# Patient Record
Sex: MALE | Race: WHITE | NOT HISPANIC OR LATINO | Employment: OTHER | URBAN - METROPOLITAN AREA
[De-identification: names, ages, dates, MRNs, and addresses within clinical notes are randomized per-mention and may not be internally consistent; named-entity substitution may affect disease eponyms.]

---

## 2021-01-21 ENCOUNTER — DOCUMENTATION ONLY (OUTPATIENT)
Facility: CLINIC | Age: 86
End: 2021-01-21

## 2021-01-22 NOTE — PROGRESS NOTES
PROVIDER NOTIFICATION OF MISSED VISIT - No Action Required  TODAY'S DATE:    PROVIDER:  Dr. Sauceda  NOTIFICATION METHOD (Fax #, Phone #l, EPIC, etc):  EPIC  PATIENT NAME:  Jacob Boland  PATIENT ID:  [id number]  :  33    Medicare Home Health regulation requires [facility] to notify the physician when the plan for visits has been altered.  We have provided fewer visits than ordered.            Missed service:  HA VISIT            Date(s) of missed service:  21 - 2 HR duration            Reason:  Per aide - not available.  The patient has been notified.  Please call our office at [phone] if this is not satisfactory to you.

## 2021-02-12 PROCEDURE — U0003 INFECTIOUS AGENT DETECTION BY NUCLEIC ACID (DNA OR RNA); SEVERE ACUTE RESPIRATORY SYNDROME CORONAVIRUS 2 (SARS-COV-2) (CORONAVIRUS DISEASE [COVID-19]), AMPLIFIED PROBE TECHNIQUE, MAKING USE OF HIGH THROUGHPUT TECHNOLOGIES AS DESCRIBED BY CMS-2020-01-R: HCPCS | Mod: ORL

## 2023-01-01 ENCOUNTER — LAB REQUISITION (OUTPATIENT)
Dept: LAB | Facility: CLINIC | Age: 88
End: 2023-01-01
Payer: MEDICARE

## 2023-01-01 ENCOUNTER — NURSING HOME VISIT (OUTPATIENT)
Dept: GERIATRICS | Facility: CLINIC | Age: 88
End: 2023-01-01
Payer: MEDICARE

## 2023-01-01 ENCOUNTER — APPOINTMENT (OUTPATIENT)
Dept: CT IMAGING | Facility: CLINIC | Age: 88
End: 2023-01-01
Attending: PHYSICIAN ASSISTANT
Payer: MEDICARE

## 2023-01-01 ENCOUNTER — ASSISTED LIVING VISIT (OUTPATIENT)
Dept: GERIATRICS | Facility: CLINIC | Age: 88
End: 2023-01-01
Payer: MEDICARE

## 2023-01-01 ENCOUNTER — HOSPITAL ENCOUNTER (EMERGENCY)
Facility: CLINIC | Age: 88
Discharge: HOME | End: 2023-12-06
Attending: PHYSICIAN ASSISTANT | Admitting: PHYSICIAN ASSISTANT
Payer: MEDICARE

## 2023-01-01 ENCOUNTER — MEDICAL CORRESPONDENCE (OUTPATIENT)
Dept: HEALTH INFORMATION MANAGEMENT | Facility: CLINIC | Age: 88
End: 2023-01-01

## 2023-01-01 ENCOUNTER — DISCHARGE SUMMARY NURSING HOME (OUTPATIENT)
Dept: GERIATRICS | Facility: CLINIC | Age: 88
End: 2023-01-01
Payer: MEDICARE

## 2023-01-01 ENCOUNTER — LAB REQUISITION (OUTPATIENT)
Dept: LAB | Facility: CLINIC | Age: 88
End: 2023-01-01

## 2023-01-01 ENCOUNTER — TELEPHONE (OUTPATIENT)
Dept: GERIATRICS | Facility: CLINIC | Age: 88
End: 2023-01-01
Payer: MEDICARE

## 2023-01-01 ENCOUNTER — DOCUMENTATION ONLY (OUTPATIENT)
Dept: OTHER | Facility: CLINIC | Age: 88
End: 2023-01-01
Payer: MEDICARE

## 2023-01-01 ENCOUNTER — DOCUMENTATION ONLY (OUTPATIENT)
Dept: GERIATRICS | Facility: CLINIC | Age: 88
End: 2023-01-01
Payer: MEDICARE

## 2023-01-01 VITALS
OXYGEN SATURATION: 95 % | DIASTOLIC BLOOD PRESSURE: 97 MMHG | TEMPERATURE: 98.9 F | HEART RATE: 64 BPM | RESPIRATION RATE: 20 BRPM | SYSTOLIC BLOOD PRESSURE: 123 MMHG

## 2023-01-01 VITALS
SYSTOLIC BLOOD PRESSURE: 125 MMHG | WEIGHT: 177 LBS | TEMPERATURE: 97.8 F | DIASTOLIC BLOOD PRESSURE: 68 MMHG | RESPIRATION RATE: 18 BRPM | OXYGEN SATURATION: 95 % | HEART RATE: 68 BPM

## 2023-01-01 VITALS
HEART RATE: 79 BPM | SYSTOLIC BLOOD PRESSURE: 120 MMHG | WEIGHT: 179 LBS | OXYGEN SATURATION: 96 % | RESPIRATION RATE: 18 BRPM | BODY MASS INDEX: 26.51 KG/M2 | TEMPERATURE: 97.8 F | HEIGHT: 69 IN | DIASTOLIC BLOOD PRESSURE: 69 MMHG

## 2023-01-01 VITALS
HEIGHT: 69 IN | RESPIRATION RATE: 18 BRPM | TEMPERATURE: 97.9 F | HEART RATE: 66 BPM | WEIGHT: 177.5 LBS | DIASTOLIC BLOOD PRESSURE: 76 MMHG | SYSTOLIC BLOOD PRESSURE: 118 MMHG | BODY MASS INDEX: 26.29 KG/M2 | OXYGEN SATURATION: 95 %

## 2023-01-01 VITALS
RESPIRATION RATE: 19 BRPM | HEART RATE: 53 BPM | WEIGHT: 168 LBS | TEMPERATURE: 97.6 F | SYSTOLIC BLOOD PRESSURE: 137 MMHG | DIASTOLIC BLOOD PRESSURE: 65 MMHG

## 2023-01-01 VITALS
SYSTOLIC BLOOD PRESSURE: 133 MMHG | DIASTOLIC BLOOD PRESSURE: 64 MMHG | WEIGHT: 168 LBS | TEMPERATURE: 97.3 F | HEART RATE: 72 BPM | RESPIRATION RATE: 18 BRPM

## 2023-01-01 DIAGNOSIS — G60.9 IDIOPATHIC NEUROPATHY: ICD-10-CM

## 2023-01-01 DIAGNOSIS — U07.1 COVID-19: ICD-10-CM

## 2023-01-01 DIAGNOSIS — K21.9 GASTROESOPHAGEAL REFLUX DISEASE, UNSPECIFIED WHETHER ESOPHAGITIS PRESENT: ICD-10-CM

## 2023-01-01 DIAGNOSIS — E56.9 VITAMIN DEFICIENCY: ICD-10-CM

## 2023-01-01 DIAGNOSIS — R29.6 FALLS FREQUENTLY: ICD-10-CM

## 2023-01-01 DIAGNOSIS — Z11.1 ENCOUNTER FOR SCREENING FOR RESPIRATORY TUBERCULOSIS: ICD-10-CM

## 2023-01-01 DIAGNOSIS — J44.9 CHRONIC OBSTRUCTIVE PULMONARY DISEASE, UNSPECIFIED COPD TYPE (H): Primary | ICD-10-CM

## 2023-01-01 DIAGNOSIS — F03.B0 MODERATE DEMENTIA WITHOUT BEHAVIORAL DISTURBANCE, PSYCHOTIC DISTURBANCE, MOOD DISTURBANCE, OR ANXIETY, UNSPECIFIED DEMENTIA TYPE (H): ICD-10-CM

## 2023-01-01 DIAGNOSIS — D64.9 ANEMIA, UNSPECIFIED TYPE: ICD-10-CM

## 2023-01-01 DIAGNOSIS — M15.0 PRIMARY OSTEOARTHRITIS INVOLVING MULTIPLE JOINTS: ICD-10-CM

## 2023-01-01 DIAGNOSIS — K59.01 SLOW TRANSIT CONSTIPATION: ICD-10-CM

## 2023-01-01 DIAGNOSIS — M25.562 CHRONIC PAIN OF BOTH KNEES: ICD-10-CM

## 2023-01-01 DIAGNOSIS — F33.9 RECURRENT MAJOR DEPRESSIVE DISORDER, REMISSION STATUS UNSPECIFIED (H): ICD-10-CM

## 2023-01-01 DIAGNOSIS — N40.1 BENIGN PROSTATIC HYPERPLASIA WITH NOCTURIA: ICD-10-CM

## 2023-01-01 DIAGNOSIS — G89.29 CHRONIC PAIN OF BOTH KNEES: Primary | ICD-10-CM

## 2023-01-01 DIAGNOSIS — R35.1 BENIGN PROSTATIC HYPERPLASIA WITH NOCTURIA: ICD-10-CM

## 2023-01-01 DIAGNOSIS — G89.29 CHRONIC PAIN OF BOTH KNEES: ICD-10-CM

## 2023-01-01 DIAGNOSIS — R52 PAIN: Primary | ICD-10-CM

## 2023-01-01 DIAGNOSIS — B97.4 RESPIRATORY SYNCYTIAL VIRUS AS THE CAUSE OF DISEASES CLASSIFIED ELSEWHERE: ICD-10-CM

## 2023-01-01 DIAGNOSIS — K21.9 GERD (GASTROESOPHAGEAL REFLUX DISEASE): ICD-10-CM

## 2023-01-01 DIAGNOSIS — F43.22 ADJUSTMENT DISORDER WITH ANXIOUS MOOD: ICD-10-CM

## 2023-01-01 DIAGNOSIS — G47.00 INSOMNIA: ICD-10-CM

## 2023-01-01 DIAGNOSIS — M25.562 CHRONIC PAIN OF BOTH KNEES: Primary | ICD-10-CM

## 2023-01-01 DIAGNOSIS — J44.9 CHRONIC OBSTRUCTIVE PULMONARY DISEASE, UNSPECIFIED COPD TYPE (H): ICD-10-CM

## 2023-01-01 DIAGNOSIS — N40.0 BPH (BENIGN PROSTATIC HYPERPLASIA): ICD-10-CM

## 2023-01-01 DIAGNOSIS — M50.30 DDD (DEGENERATIVE DISC DISEASE), CERVICAL: ICD-10-CM

## 2023-01-01 DIAGNOSIS — N40.0 BENIGN PROSTATIC HYPERPLASIA, UNSPECIFIED WHETHER LOWER URINARY TRACT SYMPTOMS PRESENT: ICD-10-CM

## 2023-01-01 DIAGNOSIS — Z71.89 ACP (ADVANCE CARE PLANNING): ICD-10-CM

## 2023-01-01 DIAGNOSIS — F03.B0 MODERATE DEMENTIA WITHOUT BEHAVIORAL DISTURBANCE, PSYCHOTIC DISTURBANCE, MOOD DISTURBANCE, OR ANXIETY, UNSPECIFIED DEMENTIA TYPE (H): Primary | ICD-10-CM

## 2023-01-01 DIAGNOSIS — D64.9 ANEMIA, UNSPECIFIED: ICD-10-CM

## 2023-01-01 DIAGNOSIS — M25.561 CHRONIC PAIN OF BOTH KNEES: Primary | ICD-10-CM

## 2023-01-01 DIAGNOSIS — M54.2 CERVICAL PAIN: ICD-10-CM

## 2023-01-01 DIAGNOSIS — N40.1 BENIGN PROSTATIC HYPERPLASIA WITH NOCTURIA: Primary | ICD-10-CM

## 2023-01-01 DIAGNOSIS — N40.1 BENIGN PROSTATIC HYPERPLASIA WITH LOWER URINARY TRACT SYMPTOMS: ICD-10-CM

## 2023-01-01 DIAGNOSIS — M25.561 CHRONIC PAIN OF BOTH KNEES: ICD-10-CM

## 2023-01-01 DIAGNOSIS — S09.90XA INJURY OF HEAD, INITIAL ENCOUNTER: ICD-10-CM

## 2023-01-01 DIAGNOSIS — R35.1 BENIGN PROSTATIC HYPERPLASIA WITH NOCTURIA: Primary | ICD-10-CM

## 2023-01-01 DIAGNOSIS — J10.1 INFLUENZA DUE TO OTHER IDENTIFIED INFLUENZA VIRUS WITH OTHER RESPIRATORY MANIFESTATIONS: ICD-10-CM

## 2023-01-01 DIAGNOSIS — S09.90XA TRAUMATIC INJURY OF HEAD, INITIAL ENCOUNTER: Primary | ICD-10-CM

## 2023-01-01 LAB
ALBUMIN UR-MCNC: NEGATIVE MG/DL
ANION GAP SERPL CALCULATED.3IONS-SCNC: 7 MMOL/L (ref 7–15)
ANION GAP SERPL CALCULATED.3IONS-SCNC: 9 MMOL/L (ref 7–15)
APPEARANCE UR: CLEAR
BASOPHILS # BLD AUTO: 0 10E3/UL (ref 0–0.2)
BASOPHILS NFR BLD AUTO: 1 %
BILIRUB UR QL STRIP: NEGATIVE
BUN SERPL-MCNC: 14.5 MG/DL (ref 8–23)
BUN SERPL-MCNC: 16.2 MG/DL (ref 8–23)
CALCIUM SERPL-MCNC: 8.5 MG/DL (ref 8.2–9.6)
CALCIUM SERPL-MCNC: 9.1 MG/DL (ref 8.2–9.6)
CHLORIDE SERPL-SCNC: 101 MMOL/L (ref 98–107)
CHLORIDE SERPL-SCNC: 101 MMOL/L (ref 98–107)
COLOR UR AUTO: ABNORMAL
CREAT SERPL-MCNC: 0.74 MG/DL (ref 0.67–1.17)
CREAT SERPL-MCNC: 0.81 MG/DL (ref 0.67–1.17)
DEPRECATED HCO3 PLAS-SCNC: 28 MMOL/L (ref 22–29)
DEPRECATED HCO3 PLAS-SCNC: 30 MMOL/L (ref 22–29)
EGFRCR SERPLBLD CKD-EPI 2021: 84 ML/MIN/1.73M2
EGFRCR SERPLBLD CKD-EPI 2021: 86 ML/MIN/1.73M2
EOSINOPHIL # BLD AUTO: 0.3 10E3/UL (ref 0–0.7)
EOSINOPHIL NFR BLD AUTO: 4 %
ERYTHROCYTE [DISTWIDTH] IN BLOOD BY AUTOMATED COUNT: 14 % (ref 10–15)
ERYTHROCYTE [DISTWIDTH] IN BLOOD BY AUTOMATED COUNT: 14.5 % (ref 10–15)
FLUAV RNA SPEC QL NAA+PROBE: NEGATIVE
FLUBV RNA RESP QL NAA+PROBE: NEGATIVE
GAMMA INTERFERON BACKGROUND BLD IA-ACNC: 0 IU/ML
GLUCOSE SERPL-MCNC: 68 MG/DL (ref 70–99)
GLUCOSE SERPL-MCNC: 96 MG/DL (ref 70–99)
GLUCOSE UR STRIP-MCNC: NEGATIVE MG/DL
HCT VFR BLD AUTO: 37.4 % (ref 40–53)
HCT VFR BLD AUTO: 38 % (ref 40–53)
HGB BLD-MCNC: 11.9 G/DL (ref 13.3–17.7)
HGB BLD-MCNC: 12 G/DL (ref 13.3–17.7)
HGB UR QL STRIP: NEGATIVE
HOLD SPECIMEN: NORMAL
HOLD SPECIMEN: NORMAL
HYALINE CASTS: 3 /LPF
IMM GRANULOCYTES # BLD: 0 10E3/UL
IMM GRANULOCYTES NFR BLD: 0 %
KETONES UR STRIP-MCNC: NEGATIVE MG/DL
LEUKOCYTE ESTERASE UR QL STRIP: NEGATIVE
LYMPHOCYTES # BLD AUTO: 1.8 10E3/UL (ref 0.8–5.3)
LYMPHOCYTES NFR BLD AUTO: 26 %
M TB IFN-G BLD-IMP: NEGATIVE
M TB IFN-G CD4+ BCKGRND COR BLD-ACNC: 10 IU/ML
MCH RBC QN AUTO: 29.2 PG (ref 26.5–33)
MCH RBC QN AUTO: 29.6 PG (ref 26.5–33)
MCHC RBC AUTO-ENTMCNC: 31.3 G/DL (ref 31.5–36.5)
MCHC RBC AUTO-ENTMCNC: 32.1 G/DL (ref 31.5–36.5)
MCV RBC AUTO: 92 FL (ref 78–100)
MCV RBC AUTO: 93 FL (ref 78–100)
MITOGEN IGNF BCKGRD COR BLD-ACNC: 0 IU/ML
MITOGEN IGNF BCKGRD COR BLD-ACNC: 0.01 IU/ML
MONOCYTES # BLD AUTO: 0.7 10E3/UL (ref 0–1.3)
MONOCYTES NFR BLD AUTO: 10 %
NEUTROPHILS # BLD AUTO: 4.2 10E3/UL (ref 1.6–8.3)
NEUTROPHILS NFR BLD AUTO: 59 %
NITRATE UR QL: NEGATIVE
NRBC # BLD AUTO: 0 10E3/UL
NRBC BLD AUTO-RTO: 0 /100
PH UR STRIP: 7 [PH] (ref 5–7)
PLATELET # BLD AUTO: 178 10E3/UL (ref 150–450)
PLATELET # BLD AUTO: 196 10E3/UL (ref 150–450)
POTASSIUM SERPL-SCNC: 4.6 MMOL/L (ref 3.4–5.3)
POTASSIUM SERPL-SCNC: 4.9 MMOL/L (ref 3.4–5.3)
QUANTIFERON MITOGEN: 10 IU/ML
QUANTIFERON NIL TUBE: 0 IU/ML
QUANTIFERON TB1 TUBE: 0.01 IU/ML
QUANTIFERON TB2 TUBE: 0
RBC # BLD AUTO: 4.06 10E6/UL (ref 4.4–5.9)
RBC # BLD AUTO: 4.08 10E6/UL (ref 4.4–5.9)
RBC URINE: 1 /HPF
RSV RNA SPEC NAA+PROBE: NEGATIVE
SARS-COV-2 RNA RESP QL NAA+PROBE: NEGATIVE
SODIUM SERPL-SCNC: 138 MMOL/L (ref 135–145)
SODIUM SERPL-SCNC: 138 MMOL/L (ref 135–145)
SP GR UR STRIP: 1.01 (ref 1–1.03)
SQUAMOUS EPITHELIAL: <1 /HPF
UROBILINOGEN UR STRIP-MCNC: NORMAL MG/DL
WBC # BLD AUTO: 6.6 10E3/UL (ref 4–11)
WBC # BLD AUTO: 7.1 10E3/UL (ref 4–11)
WBC URINE: 3 /HPF

## 2023-01-01 PROCEDURE — 86481 TB AG RESPONSE T-CELL SUSP: CPT | Mod: ORL

## 2023-01-01 PROCEDURE — 87635 SARS-COV-2 COVID-19 AMP PRB: CPT | Mod: ORL

## 2023-01-01 PROCEDURE — 36415 COLL VENOUS BLD VENIPUNCTURE: CPT | Performed by: PHYSICIAN ASSISTANT

## 2023-01-01 PROCEDURE — 87637 SARSCOV2&INF A&B&RSV AMP PRB: CPT | Performed by: PHYSICIAN ASSISTANT

## 2023-01-01 PROCEDURE — 36415 COLL VENOUS BLD VENIPUNCTURE: CPT | Mod: ORL

## 2023-01-01 PROCEDURE — G1010 CDSM STANSON: HCPCS

## 2023-01-01 PROCEDURE — 80048 BASIC METABOLIC PNL TOTAL CA: CPT | Performed by: PHYSICIAN ASSISTANT

## 2023-01-01 PROCEDURE — 81001 URINALYSIS AUTO W/SCOPE: CPT | Performed by: PHYSICIAN ASSISTANT

## 2023-01-01 PROCEDURE — 96361 HYDRATE IV INFUSION ADD-ON: CPT

## 2023-01-01 PROCEDURE — 85018 HEMOGLOBIN: CPT | Performed by: PHYSICIAN ASSISTANT

## 2023-01-01 PROCEDURE — 99309 SBSQ NF CARE MODERATE MDM 30: CPT

## 2023-01-01 PROCEDURE — 99309 SBSQ NF CARE MODERATE MDM 30: CPT | Performed by: INTERNAL MEDICINE

## 2023-01-01 PROCEDURE — 99345 HOME/RES VST NEW HIGH MDM 75: CPT | Performed by: NURSE PRACTITIONER

## 2023-01-01 PROCEDURE — P9604 ONE-WAY ALLOW PRORATED TRIP: HCPCS | Mod: ORL

## 2023-01-01 PROCEDURE — 80048 BASIC METABOLIC PNL TOTAL CA: CPT | Mod: ORL

## 2023-01-01 PROCEDURE — 99344 HOME/RES VST NEW MOD MDM 60: CPT | Performed by: INTERNAL MEDICINE

## 2023-01-01 PROCEDURE — 96360 HYDRATION IV INFUSION INIT: CPT

## 2023-01-01 PROCEDURE — 258N000003 HC RX IP 258 OP 636: Performed by: PHYSICIAN ASSISTANT

## 2023-01-01 PROCEDURE — 99349 HOME/RES VST EST MOD MDM 40: CPT | Performed by: NURSE PRACTITIONER

## 2023-01-01 PROCEDURE — 85027 COMPLETE CBC AUTOMATED: CPT | Mod: ORL

## 2023-01-01 PROCEDURE — 85041 AUTOMATED RBC COUNT: CPT | Performed by: PHYSICIAN ASSISTANT

## 2023-01-01 PROCEDURE — 99284 EMERGENCY DEPT VISIT MOD MDM: CPT | Mod: 25

## 2023-01-01 RX ORDER — TAMSULOSIN HYDROCHLORIDE 0.4 MG/1
0.4 CAPSULE ORAL DAILY
Qty: 90 CAPSULE | Refills: 3 | Status: SHIPPED | OUTPATIENT
Start: 2023-01-01

## 2023-01-01 RX ORDER — ELECTROLYTES/DEXTROSE
1 SOLUTION, ORAL ORAL DAILY
Qty: 90 TABLET | Refills: 3 | Status: SHIPPED | OUTPATIENT
Start: 2023-01-01 | End: 2024-01-01

## 2023-01-01 RX ORDER — DULOXETIN HYDROCHLORIDE 30 MG/1
1 CAPSULE, DELAYED RELEASE ORAL DAILY
COMMUNITY
Start: 2023-04-27 | End: 2023-01-01

## 2023-01-01 RX ORDER — VITAMIN B COMPLEX
1 TABLET ORAL DAILY
Qty: 90 TABLET | Refills: 3 | Status: SHIPPED | OUTPATIENT
Start: 2023-01-01 | End: 2024-01-01

## 2023-01-01 RX ORDER — TAMSULOSIN HYDROCHLORIDE 0.4 MG/1
0.4 CAPSULE ORAL DAILY
COMMUNITY
Start: 2023-04-26 | End: 2023-01-01

## 2023-01-01 RX ORDER — LIDOCAINE 50 MG/G
1 PATCH TOPICAL EVERY 12 HOURS PRN
COMMUNITY
Start: 2023-01-01 | End: 2023-01-01 | Stop reason: DRUGHIGH

## 2023-01-01 RX ORDER — LANOLIN ALCOHOL/MO/W.PET/CERES
3 CREAM (GRAM) TOPICAL AT BEDTIME
COMMUNITY
Start: 2023-04-26 | End: 2023-01-01

## 2023-01-01 RX ORDER — LANOLIN ALCOHOL/MO/W.PET/CERES
3 CREAM (GRAM) TOPICAL AT BEDTIME
Qty: 90 TABLET | Refills: 3 | Status: SHIPPED | OUTPATIENT
Start: 2023-01-01

## 2023-01-01 RX ORDER — ALBUTEROL SULFATE 90 UG/1
1-2 AEROSOL, METERED RESPIRATORY (INHALATION) EVERY 4 HOURS PRN
COMMUNITY
Start: 2023-04-26

## 2023-01-01 RX ORDER — LIDOCAINE 4 G/G
1 PATCH TOPICAL EVERY 24 HOURS
COMMUNITY
End: 2023-01-01

## 2023-01-01 RX ORDER — CALCIUM CARBONATE 500 MG/1
1 TABLET, CHEWABLE ORAL 3 TIMES DAILY PRN
COMMUNITY

## 2023-01-01 RX ORDER — POLYETHYLENE GLYCOL 3350
17 POWDER (GRAM) MISCELLANEOUS
COMMUNITY
Start: 2023-01-01

## 2023-01-01 RX ORDER — VITAMIN B COMPLEX
1 TABLET ORAL DAILY
COMMUNITY
Start: 2023-04-26 | End: 2023-01-01

## 2023-01-01 RX ORDER — MIRABEGRON 25 MG/1
25 TABLET, FILM COATED, EXTENDED RELEASE ORAL DAILY
Qty: 90 TABLET | Refills: 3 | Status: SHIPPED | OUTPATIENT
Start: 2023-01-01 | End: 2024-01-01

## 2023-01-01 RX ORDER — DULOXETIN HYDROCHLORIDE 30 MG/1
30 CAPSULE, DELAYED RELEASE ORAL DAILY
Qty: 90 CAPSULE | Refills: 3 | Status: SHIPPED | OUTPATIENT
Start: 2023-01-01 | End: 2024-01-01

## 2023-01-01 RX ORDER — SENNOSIDES A AND B 8.6 MG/1
8.6 TABLET, FILM COATED ORAL 2 TIMES DAILY PRN
COMMUNITY
Start: 2023-04-26

## 2023-01-01 RX ORDER — POLYVINYL ALCOHOL 14 MG/ML
1 SOLUTION/ DROPS OPHTHALMIC EVERY 4 HOURS PRN
COMMUNITY

## 2023-01-01 RX ORDER — LIDOCAINE 4 G/G
1 PATCH TOPICAL EVERY 24 HOURS
Qty: 30 PATCH | Refills: 11 | Status: SHIPPED | OUTPATIENT
Start: 2023-01-01

## 2023-01-01 RX ORDER — ACETAMINOPHEN 500 MG
2 TABLET ORAL 3 TIMES DAILY
COMMUNITY
Start: 2023-01-01 | End: 2023-01-01

## 2023-01-01 RX ORDER — ACETAMINOPHEN 500 MG
1000 TABLET ORAL 3 TIMES DAILY
Qty: 540 TABLET | Refills: 3 | Status: SHIPPED | OUTPATIENT
Start: 2023-01-01

## 2023-01-01 RX ORDER — MIRABEGRON 25 MG/1
1 TABLET, FILM COATED, EXTENDED RELEASE ORAL DAILY
COMMUNITY
Start: 2023-04-26 | End: 2023-01-01

## 2023-01-01 RX ADMIN — SODIUM CHLORIDE 1000 ML: 9 INJECTION, SOLUTION INTRAVENOUS at 13:39

## 2023-01-01 ASSESSMENT — ACTIVITIES OF DAILY LIVING (ADL)
ADLS_ACUITY_SCORE: 35
ADLS_ACUITY_SCORE: 35

## 2023-05-11 ENCOUNTER — LAB REQUISITION (OUTPATIENT)
Dept: LAB | Facility: CLINIC | Age: 88
End: 2023-05-11
Payer: MEDICARE

## 2023-05-11 ENCOUNTER — LAB REQUISITION (OUTPATIENT)
Dept: LAB | Facility: CLINIC | Age: 88
End: 2023-05-11

## 2023-05-11 DIAGNOSIS — M54.2 CERVICALGIA: ICD-10-CM

## 2023-05-11 DIAGNOSIS — R52 PAIN, UNSPECIFIED: ICD-10-CM

## 2023-05-12 LAB
ANION GAP SERPL CALCULATED.3IONS-SCNC: 12 MMOL/L (ref 7–15)
BASOPHILS # BLD AUTO: 0.1 10E3/UL (ref 0–0.2)
BASOPHILS NFR BLD AUTO: 1 %
BUN SERPL-MCNC: 13.2 MG/DL (ref 8–23)
CALCIUM SERPL-MCNC: 9.3 MG/DL (ref 8.2–9.6)
CHLORIDE SERPL-SCNC: 102 MMOL/L (ref 98–107)
CREAT SERPL-MCNC: 0.77 MG/DL (ref 0.67–1.17)
DEPRECATED HCO3 PLAS-SCNC: 26 MMOL/L (ref 22–29)
EOSINOPHIL # BLD AUTO: 0.2 10E3/UL (ref 0–0.7)
EOSINOPHIL NFR BLD AUTO: 3 %
ERYTHROCYTE [DISTWIDTH] IN BLOOD BY AUTOMATED COUNT: 13.3 % (ref 10–15)
GFR SERPL CREATININE-BSD FRML MDRD: 85 ML/MIN/1.73M2
GLUCOSE SERPL-MCNC: 92 MG/DL (ref 70–99)
HCT VFR BLD AUTO: 38.3 % (ref 40–53)
HGB BLD-MCNC: 11.5 G/DL (ref 13.3–17.7)
IMM GRANULOCYTES # BLD: 0 10E3/UL
IMM GRANULOCYTES NFR BLD: 0 %
LYMPHOCYTES # BLD AUTO: 1.5 10E3/UL (ref 0.8–5.3)
LYMPHOCYTES NFR BLD AUTO: 19 %
MCH RBC QN AUTO: 29 PG (ref 26.5–33)
MCHC RBC AUTO-ENTMCNC: 30 G/DL (ref 31.5–36.5)
MCV RBC AUTO: 97 FL (ref 78–100)
MONOCYTES # BLD AUTO: 0.4 10E3/UL (ref 0–1.3)
MONOCYTES NFR BLD AUTO: 5 %
NEUTROPHILS # BLD AUTO: 5.7 10E3/UL (ref 1.6–8.3)
NEUTROPHILS NFR BLD AUTO: 72 %
NRBC # BLD AUTO: 0 10E3/UL
NRBC BLD AUTO-RTO: 0 /100
PLATELET # BLD AUTO: 351 10E3/UL (ref 150–450)
POTASSIUM SERPL-SCNC: 3.8 MMOL/L (ref 3.4–5.3)
RBC # BLD AUTO: 3.96 10E6/UL (ref 4.4–5.9)
SODIUM SERPL-SCNC: 140 MMOL/L (ref 136–145)
WBC # BLD AUTO: 7.9 10E3/UL (ref 4–11)

## 2023-05-12 PROCEDURE — 85025 COMPLETE CBC W/AUTO DIFF WBC: CPT | Mod: ORL | Performed by: NURSE PRACTITIONER

## 2023-05-12 PROCEDURE — P9604 ONE-WAY ALLOW PRORATED TRIP: HCPCS | Mod: ORL | Performed by: NURSE PRACTITIONER

## 2023-05-12 PROCEDURE — 36415 COLL VENOUS BLD VENIPUNCTURE: CPT | Performed by: NURSE PRACTITIONER

## 2023-05-12 PROCEDURE — 80048 BASIC METABOLIC PNL TOTAL CA: CPT | Performed by: NURSE PRACTITIONER

## 2023-07-06 PROBLEM — F03.B0 MODERATE DEMENTIA WITHOUT BEHAVIORAL DISTURBANCE, PSYCHOTIC DISTURBANCE, MOOD DISTURBANCE, OR ANXIETY, UNSPECIFIED DEMENTIA TYPE (H): Status: ACTIVE | Noted: 2023-01-01

## 2023-07-06 PROBLEM — J44.9 CHRONIC OBSTRUCTIVE PULMONARY DISEASE, UNSPECIFIED COPD TYPE (H): Status: ACTIVE | Noted: 2023-01-01

## 2023-07-06 NOTE — LETTER
7/6/2023        RE: Jacob Boland  [address]  [facility] [address]        [facility] GERIATRICS    PRIMARY CARE PROVIDER AND CLINIC:  SHERI Hill CNP, [address] [facility] [address]  Chief Complaint   Patient presents with     [facility] Medical Record Number:  [id number]  Place of Service where encounter took place:  [facility] ([facility]) [[id number]]    Jacob Boland  is a 89 year old  (1/11/1933), admitted to the above facility from home..   HPI:    Patient is a 89 year male with PMH significant for COPD, depression/anxiety, BPH, DDD with cervical neck pain, OA, cognitive impairment, HLD, frequent falls, and vitamin B deficiency.  [place] [facility] a fall at home.  He was found by EMS confused an agitated.  He did rehab at TCU and did well but no longer safe for him to be home on his own, so admitting to AL facility    He is seen today to establish care.  [facility]  He reports doing ok.  He does not love being in AL facility but has come to terms that it is best for him.  He denies CP, shortness of breath, dizziness or lightheadedness.  He is primarily in w/c, transfers with SBA, able to ambulate short distance with 4ww and SBA.  He has chronic pain to neck and knees, he reports pain is now manageable with current treatment of APAP, voltaren and lidocaine patch, rates it 4/10 with meds, denies it waking him up.  He states appetite is good, sleeping well.    Attempted to discuss goals of care and POLST with patient today.  He did not want to give an answer and referred me back to look at his POLST he previously filled out.  His POLST and his health care directive did not match and were completed within 5 days of each other.  I was able to call and speak with his daughter, Gregoria, who is his legal healthcare decision maker.  She was visiting him in [place]  when healthcare directive was completed and states that he discussed with her wanted to be DNR/DNI    CODE STATUS/ADVANCE DIRECTIVES DISCUSSION:  No CPR- Do NOT Intubate  CPR/Full code   ALLERGIES:   Allergies   Allergen Reactions     Atorvastatin Other (See Comments) and Unknown     Naproxen      Other reaction(s): Stomach Upset, Unknown     Pravastatin Muscle Pain (Myalgia)     Simvastatin      Other reaction(s): Arthralgia, Unknown      PAST MEDICAL HISTORY:   Past Medical History:   Diagnosis Date     Anxiety      Chronic osteoarthritis      COPD (chronic obstructive pulmonary disease) (H)      Depressive disorder      Gastroesophageal reflux disease       PAST SURGICAL HISTORY:   has a past surgical history that includes joint replacement, hip rt/lt (Right) and Lumbar Laminectomy (2006).  FAMILY HISTORY: family history includes Alzheimer Disease in his brother; Cancer in his father; Myocardial Infarction in his mother.  SOCIAL HISTORY:   reports that he has never smoked. He has never used smokeless tobacco.  Patient's living condition: lives in an assisted living facility  .  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          Post Discharge Medication Reconciliation Status:   MED REC REQUIRED  Post Medication Reconciliation Status:  Discharge medications reconciled and changed, see notes/orders         Current Outpatient Medications   Medication Sig     acetaminophen (TYLENOL) 500 MG tablet Take 2 tablets by mouth 3 times daily     albuterol (PROAIR HFA/PROVENTIL HFA/VENTOLIN HFA) 108 (90 Base) MCG/ACT inhaler Inhale 1-2 puffs into the lungs every 4 hours as needed     calcium carbonate (TUMS) 500 MG chewable tablet Take 1 chew tab by mouth 3 times daily as needed for heartburn     diclofenac (VOLTAREN) 1 % topical gel Apply 2-4 g topically 2 times daily     DULoxetine (CYMBALTA) 30 MG capsule Take 1 capsule by mouth daily     lidocaine (LIDODERM) 5 % patch Place 1 patch onto the skin  every 12 hours as needed     melatonin 3 MG tablet Take 3 mg by mouth At Bedtime     mirabegron (MYRBETRIQ) 25 MG 24 hr tablet Take 1 tablet by mouth daily     Multiple Vitamin (MULTIVITAMIN ADULT PO) Take 1 tablet by mouth daily     omeprazole (PRILOSEC) 20 MG DR capsule Take 1 capsule by mouth daily     polyethylene glycol 3350 POWD Take 17 g by mouth every 48 hours     polyvinyl alcohol (LIQUIFILM TEARS) 1.4 % ophthalmic solution Place 1 drop into both eyes every 4 hours as needed for dry eyes     senna (SENOKOT) 8.6 MG tablet Take 8.6 mg by mouth 2 times daily as needed     tamsulosin (FLOMAX) 0.4 MG capsule Take 0.4 mg by mouth daily     vitamin B-12 (CYANOCOBALAMIN) 1000 MCG tablet Take 1 tablet by mouth daily     Vitamin D3 (CHOLECALCIFEROL) 25 mcg (1000 units) tablet Take 1 tablet by mouth daily     No current facility-administered medications for this visit.       ROS:  10 point ROS of systems including Constitutional, Eyes, Respiratory, Cardiovascular, Gastroenterology, Genitourinary, Integumentary, Musculoskeletal, Psychiatric were all negative except for pertinent positives noted in my HPI.    Vitals:  /64   Pulse 72   Temp 97.3  F (36.3  C)   Resp 18   Wt 76.2 kg (168 lb)   Exam:  GENERAL APPEARANCE:  Alert, in no distress, cooperative  EYES:  EOM, conjunctivae, lids, pupils and irises normal  NECK:  No adenopathy,masses or thyromegaly  RESP:  respiratory effort and palpation of chest normal, lungs clear to auscultation , no respiratory distress, on room air, no cough  CV:  Palpation and auscultation of heart done , regular rate and rhythm, no murmur, rub, or gallop, 1+ BLLE edema  ABDOMEN:  no guarding or rebound, bowel sounds normal, no organomegaly  M/S:   Gait and station abnormal primarily w/c bound, tenderness to palpation of cervical spine, ROM to bilat knees impaired, uses 4ww to ambulate  SKIN:  no open areas or rashes noted  NEURO:   speech clear, no tremor, normal strength and  tone  PSYCH:  oriented X 3, memory impaired , affect and mood normal    Lab/Diagnostic data:  labs reviewed in care everywhere    ASSESSMENT/PLAN:    (F03.B0) Moderate dementia without behavioral disturbance, psychotic disturbance, mood disturbance, or anxiety, unspecified dementia type (H)  (primary encounter diagnosis)  Comment: CPT 4/5.6  -no longer safe to live alone  -daughter in [place] is HCA, daughter-in-law in area also helps with cares  -noted delirium when patient was last hospitalized  Plan: in AL setting, staff assist with cares, dipense meds    (J44.9) Chronic obstructive pulmonary disease, unspecified COPD type (H)  Comment: long-standing, no s/s exacerbation  Plan: prn albuterol    (M50.30) DDD (degenerative disc disease), cervical  (M54.2) Cervical pain  Comment: ongoing cervical neck pain, has improved with lidocaine and APAP  -was followed by Dr. German and recommended repeat imagaging MRI if fall with trauma or changes in motor fx or sensation  Plan: continue APAP, lidocaine  -[facility]    (R29.6) Falls frequently  Comment: impulsive with transfers, neuropathy to LE's  -most recent hospitalization r/t fall  -made some progress in TCU  Plan: home care therapy     (F33.9) Recurrent major depressive disorder, remission status unspecified (H)  Comment:reports mood ok, recent big change moving from home to AL facility  Plan: continue Cymbalta, staff to update with concerns    (Z71.89) ACP (advance care planning)  Comment: discussed with patient and daughter as noted in HPI  Plan: DNR/DNI, POLST completed    (M25.561,  M25.562,  G89.29) Chronic pain of both knees  Comment: long-standing, per review of TCU notes, was limiting factor in rehab  -reports voltaren and APAP helpful  Plan: continue POC    Idiopathic neuropathy  Comment: contributes to falls, denies pain  Plan: primarily w/c bound, staff assist with transfers    Total time spent with patient visit at the skilled  nursing facility was 90 minutes including patient visit, review of past records, phone call to patient contact and discussion on ACP with patient and with daughter.     Electronically signed by:  SHERI Hill CNP                       Sincerely,        SHERI Hill CNP

## 2023-07-06 NOTE — PROGRESS NOTES
[facility] GERIATRICS    PRIMARY CARE PROVIDER AND CLINIC:  Nela Max, SHERI CNP, [address] [facility] [address]  Chief Complaint   Patient presents with     [facility] Medical Record Number:  [id number]  Place of Service where encounter took place:  [facility] ([facility]) [[id number]]    Jacob Boland  is a 89 year old  (1/11/1933), admitted to the above facility from home..   HPI:    Patient is a 89 year male with PMH significant for COPD, depression/anxiety, BPH, DDD with cervical neck pain, OA, cognitive impairment, HLD, frequent falls, and vitamin B deficiency.  [place] [facility] [place] a fall at home.  He was found by EMS confused an agitated.  He did rehab at TCU and did well but no longer safe for him to be home on his own, so admitting to AL facility    He is seen today to establish care.  [facility]  He reports doing ok.  He does not love being in AL facility but has come to terms that it is best for him.  He denies CP, shortness of breath, dizziness or lightheadedness.  He is primarily in w/c, transfers with SBA, able to ambulate short distance with 4ww and SBA.  He has chronic pain to neck and knees, he reports pain is now manageable with current treatment of APAP, voltaren and lidocaine patch, rates it 4/10 with meds, denies it waking him up.  He states appetite is good, sleeping well.    Attempted to discuss goals of care and POLST with patient today.  He did not want to give an answer and referred me back to look at his POLST he previously filled out.  His POLST and his health care directive did not match and were completed within 5 days of each other.  I was able to call and speak with his daughter, Gregoria, who is his legal healthcare decision maker.  She was visiting him in [place] when healthcare directive was completed and states that he discussed with her wanted to be  DNR/DNI    CODE STATUS/ADVANCE DIRECTIVES DISCUSSION:  No CPR- Do NOT Intubate  CPR/Full code   ALLERGIES:   Allergies   Allergen Reactions     Atorvastatin Other (See Comments) and Unknown     Naproxen      Other reaction(s): Stomach Upset, Unknown     Pravastatin Muscle Pain (Myalgia)     Simvastatin      Other reaction(s): Arthralgia, Unknown      PAST MEDICAL HISTORY:   Past Medical History:   Diagnosis Date     Anxiety      Chronic osteoarthritis      COPD (chronic obstructive pulmonary disease) (H)      Depressive disorder      Gastroesophageal reflux disease       PAST SURGICAL HISTORY:   has a past surgical history that includes joint replacement, hip rt/lt (Right) and Lumbar Laminectomy (2006).  FAMILY HISTORY: family history includes Alzheimer Disease in his brother; Cancer in his father; Myocardial Infarction in his mother.  SOCIAL HISTORY:   reports that he has never smoked. He has never used smokeless tobacco.  Patient's living condition: lives in an assisted living facility  .  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          Post Discharge Medication Reconciliation Status:   MED REC REQUIRED  Post Medication Reconciliation Status:  Discharge medications reconciled and changed, see notes/orders         Current Outpatient Medications   Medication Sig     acetaminophen (TYLENOL) 500 MG tablet Take 2 tablets by mouth 3 times daily     albuterol (PROAIR HFA/PROVENTIL HFA/VENTOLIN HFA) 108 (90 Base) MCG/ACT inhaler Inhale 1-2 puffs into the lungs every 4 hours as needed     calcium carbonate (TUMS) 500 MG chewable tablet Take 1 chew tab by mouth 3 times daily as needed for heartburn     diclofenac (VOLTAREN) 1 % topical gel Apply 2-4 g topically 2 times daily     DULoxetine (CYMBALTA) 30 MG capsule Take 1 capsule by mouth daily     lidocaine (LIDODERM) 5 % patch Place 1 patch onto the skin every 12 hours as needed     melatonin 3 MG tablet Take 3 mg by mouth At Bedtime     mirabegron  (MYRBETRIQ) 25 MG 24 hr tablet Take 1 tablet by mouth daily     Multiple Vitamin (MULTIVITAMIN ADULT PO) Take 1 tablet by mouth daily     omeprazole (PRILOSEC) 20 MG DR capsule Take 1 capsule by mouth daily     polyethylene glycol 3350 POWD Take 17 g by mouth every 48 hours     polyvinyl alcohol (LIQUIFILM TEARS) 1.4 % ophthalmic solution Place 1 drop into both eyes every 4 hours as needed for dry eyes     senna (SENOKOT) 8.6 MG tablet Take 8.6 mg by mouth 2 times daily as needed     tamsulosin (FLOMAX) 0.4 MG capsule Take 0.4 mg by mouth daily     vitamin B-12 (CYANOCOBALAMIN) 1000 MCG tablet Take 1 tablet by mouth daily     Vitamin D3 (CHOLECALCIFEROL) 25 mcg (1000 units) tablet Take 1 tablet by mouth daily     No current facility-administered medications for this visit.       ROS:  10 point ROS of systems including Constitutional, Eyes, Respiratory, Cardiovascular, Gastroenterology, Genitourinary, Integumentary, Musculoskeletal, Psychiatric were all negative except for pertinent positives noted in my HPI.    Vitals:  /64   Pulse 72   Temp 97.3  F (36.3  C)   Resp 18   Wt 76.2 kg (168 lb)   Exam:  GENERAL APPEARANCE:  Alert, in no distress, cooperative  EYES:  EOM, conjunctivae, lids, pupils and irises normal  NECK:  No adenopathy,masses or thyromegaly  RESP:  respiratory effort and palpation of chest normal, lungs clear to auscultation , no respiratory distress, on room air, no cough  CV:  Palpation and auscultation of heart done , regular rate and rhythm, no murmur, rub, or gallop, 1+ BLLE edema  ABDOMEN:  no guarding or rebound, bowel sounds normal, no organomegaly  M/S:   Gait and station abnormal primarily w/c bound, tenderness to palpation of cervical spine, ROM to bilat knees impaired, uses 4ww to ambulate  SKIN:  no open areas or rashes noted  NEURO:   speech clear, no tremor, normal strength and tone  PSYCH:  oriented X 3, memory impaired , affect and mood normal    Lab/Diagnostic data:  labs  reviewed in care everywhere    ASSESSMENT/PLAN:    (F03.B0) Moderate dementia without behavioral disturbance, psychotic disturbance, mood disturbance, or anxiety, unspecified dementia type (H)  (primary encounter diagnosis)  Comment: CPT 4/5.6  -no longer safe to live alone  -daughter in [place] is HCA, daughter-in-law in area also helps with cares  -noted delirium when patient was last hospitalized  Plan: in AL setting, staff assist with cares, dipense meds    (J44.9) Chronic obstructive pulmonary disease, unspecified COPD type (H)  Comment: long-standing, no s/s exacerbation  Plan: prn albuterol    (M50.30) DDD (degenerative disc disease), cervical  (M54.2) Cervical pain  Comment: ongoing cervical neck pain, has improved with lidocaine and APAP  -was followed by Dr. German and recommended repeat imagaging MRI if fall with trauma or changes in motor fx or sensation  Plan: continue APAP, lidocaine  [facility]    (R29.6) Falls frequently  Comment: impulsive with transfers, neuropathy to LE's  -most recent hospitalization r/t fall  -made some progress in TCU  Plan: home care therapy     (F33.9) Recurrent major depressive disorder, remission status unspecified (H)  Comment:reports mood ok, recent big change moving from home to AL facility  Plan: continue Cymbalta, staff to update with concerns    (Z71.89) ACP (advance care planning)  Comment: discussed with patient and daughter as noted in HPI  Plan: DNR/DNI, POLST completed    (M25.561,  M25.562,  G89.29) Chronic pain of both knees  Comment: long-standing, per review of TCU notes, was limiting factor in rehab  -reports voltaren and APAP helpful  Plan: continue POC    Idiopathic neuropathy  Comment: contributes to falls, denies pain  Plan: primarily w/c bound, staff assist with transfers    Total time spent with patient visit at the skilled nursing facility was 90 minutes including patient visit, review of past records, phone call to  patient contact and discussion on ACP with patient and with daughter.     Electronically signed by:  SHERI Hill CNP

## 2023-09-26 NOTE — PROGRESS NOTES
"Jacob Boland is a 89 year old male seen September 25, 2023 at [facility] where he has resided for 2 months (admit 7/2023) seen for initial visit.   Pt is seen in his apartment up to recliner, able to transfer himself to his WC.   Pleasant and conversational, some wordfinding difficulty and loss of fluency.   Reports he's tired.    Notes he has had neck pain recently, reviewed regimen of scheduled acetaminophen, gabapentin and diclofenac gel which he notes has been helpful.  Also using for knee pain, \"knees are sore right now.\"          He reports he was able to live in his house of many years until he contracted COVID and suffered hallucinations.   Had a fall in April 2023 and was hospitalized at [facility] [place] with delirium   No significant injury from his fall.    Discharged to [facility] /LTC, which wasn't a good fit for patient, \"I shouldn't have been there.\"     Pt has had a long h/o cognitive decline with impairment in executive function   [facility]        Past Medical History:   Diagnosis Date    Anxiety     Chronic osteoarthritis     COPD (chronic obstructive pulmonary disease) (H)     Depressive disorder     Gastroesophageal reflux disease        Past Surgical History:   Procedure Laterality Date    JOINT REPLACEMENT, HIP RT/LT Right     LUMBAR LAMINECTOMY  2006     SH:  Lived alone, house in [place]   [facility]  Daughter Gregoria is HCA, lives in [place]  Son lives in [facility]   Pt had a body work business for 40 years   Non smoker     ROS:  CPT 4.0   Nocturia 4-5x night   No GI symptoms   Breathing is okay, doesn't feel he needs his PRN albuterol inhaler       EXAM:  NAD  /64   Pulse 72   Temp 97.3  F (36.3  C)   Resp 18   Wt 76.2 kg (168 lb)   Neck supple without adenopathy  Lungs with decreased BS, coarse crackles bilaterally   Heart RRR s1s2   Abd soft, NT, no distention or guarding, +BS  Ext with 1+ ankle edema    Neuro: " struggles to articulate his thoughts, naming and wordfinding difficulties   Independent in transfer chair to    Psych: affect okay, pleasant     Lab Results   Component Value Date     05/12/2023    POTASSIUM 3.8 05/12/2023    CHLORIDE 102 05/12/2023    CO2 26 05/12/2023    ANIONGAP 12 05/12/2023    GLC 92 05/12/2023    BUN 13.2 05/12/2023    CR 0.77 05/12/2023    GFRESTIMATED 85 05/12/2023    SCOTT 9.3 05/12/2023     Lab Results   Component Value Date    WBC 7.9 05/12/2023      HGB 11.5 05/12/2023      MCV 97 05/12/2023       05/12/2023      Head CT without contrast, 4/24/2023   Findings: No acute intracranial hemorrhage, mass effect, or abnormal extraaxial fluid collection. The parenchymal volume is probably appropriate for age; the ventricles and sulci appear proportional. No acute appearing loss of the gray-white matter differentiation. Mild to moderate patchy and confluent foci of hypoattenuation in the periventricular-predominant cerebral white matter, most likely leukoaraiosis. Possible old insult about the bilateral anterior basal ganglia.       IMP/PLAN:   (M25.561,  M25.562,  G89.29) Chronic pain of both knees    (M54.2) Cervical pain  (M15.9) Primary osteoarthritis involving multiple joints  Comment: reports pain in his neck and knees today   Plan: acetaminophen 1000 mg tid, lidocaine cream, diclofenac gel    for all destinations     (J44.9) Chronic obstructive pulmonary disease, unspecified COPD type (H)  Comment: by hx  Plan: discontinue albuterol MDI as pt doesn't use it     (N40.1,  R35.1) Benign prostatic hyperplasia with nocturia  Comment: up 4-5x night   Plan: continue tamsulosin 0.4 mg/HS   Will try holding mirabegron and see if symptoms improve      Urology follow up     (F03.B0) Moderate dementia without behavioral disturbance, psychotic disturbance, mood disturbance, or anxiety, unspecified dementia type (H)  Comment: long history, with recent episode of confusion and  hallucinations    Plan: AL support for med admin, meals, activity     (G60.9) Idiopathic neuropathy  Comment: by history, limits mobility as well   Plan: duloxetine 30 mg/day      Genoveva Elizabeth MD

## 2023-10-28 NOTE — PROGRESS NOTES
[facility] GERIATRICS DISCHARGE SUMMARY  PATIENT'S NAME: Jacob Boland  YOB: 1933  MEDICAL RECORD NUMBER:  [id number]  Place of Service where encounter took place:  [facility] ([facility]) [[id number]]    PRIMARY CARE PROVIDER AND CLINIC RESPONSIBLE AFTER TRANSFER:   SHERI Hill CNP, [address] [facility] [address]    Nursing Home: [facility] ([facility])     Transferring providers: SHERI Hill CNP, Dr. Genoveva Elizabeth MD  Recent Hospitalization/ED:   no recent hospitalization or ER visits    Date of SNF Admission:  July 2023  Date of SNF (anticipated) Discharge:  sometime this week  Discharged to: new skilled nursing facility   Cognitive Scores: 4/5.6  Physical Function: Wheelchair dependent  DME: No new DME needed    CODE STATUS/ADVANCE DIRECTIVES DISCUSSION:  No CPR- Do NOT Intubate   ALLERGIES: Atorvastatin, Naproxen, Pravastatin, and Simvastatin    NURSING FACILITY COURSE   Medication Changes/Rationale:   None recently made    Summary of nursing facility stay:   Patient is a 89 year male with PMH significant for COPD, depression/anxiety, BPH, DDD with cervical neck pain, OA, cognitive impairment, HLD, frequent falls, and vitamin B deficiency.  He was most recently hospitalized at [facility] from 4/24-4/26 after a fall at home.  He was found by EMS confused an agitated.  He did rehab at TCU and did well but no longer safe for him to be home on his own, so admitting to AL facility in July 2023.  [facility].  He is discharging to care center temporarily due to financial reasons with goal to get on a  waiver and move back into another AL.  He is see in his apartment today and very confused and worried why he needs to move.  He denies short of breath, pain, or other concerns today    (J44.9) Chronic obstructive pulmonary disease, unspecified COPD type (H)  (primary encounter diagnosis)  Comment:  long-standing, no s/s exacerbation  Plan: prn albuterol    (F03.B0) Moderate dementia without behavioral disturbance, psychotic disturbance, mood disturbance, or anxiety, unspecified dementia type (H)  Comment: CPT 4/5.6  -no longer safe to live alone  -daughter in [place] is HCA, daughter-in-law in area also helps with cares  -noted delirium when patient was last hospitalized  Plan:now moving to LTC due to funds, staff assist with cares, dipense meds       (N40.1,  R35.1) Benign prostatic hyperplasia with nocturia  Comment: long-standing wakes up 4 times per night but reports falls back asleep and not bothering him  Plan: continue flomax and myrbetriq    (M15.9) Primary osteoarthritis involving multiple joints  Comment: ongoing cervical neck pain, has improved with lidocaine and APAP  -was followed by Dr. German and recommended repeat imagaging MRI if fall with trauma or changes in motor fx or sensation  Plan: continue APAP, lidocaine  -[facility]    (F33.9) Recurrent major depressive disorder, remission status unspecified (H)  Comment:reports mood ok, recent big change moving from home to AL facility  Plan: continue Cymbalta, staff to update with concerns    Idiopathic neuropathy  Comment: contributes to falls, denies pain  Plan: primarily w/c bound, staff assist with transfers    (N40.0) Benign prostatic hyperplasia, unspecified whether lower urinary tract symptoms present  Comment: frequent urination at night  -on tamsulosin, end of Sept Dr. Glenn antunez mirabegron to see if symptoms improved  Plan: tamsulosin  -follow with urology per preference      Discharge Medications:  MED REC REQUIRED  Post Medication Reconciliation Status:  Patient was not discharged from an inpatient facility or TCU     Current Outpatient Medications   Medication Sig Dispense Refill    acetaminophen (TYLENOL) 500 MG tablet Take 2 tablets (1,000 mg) by mouth 3 times daily 540 tablet 3    albuterol (PROAIR  HFA/PROVENTIL HFA/VENTOLIN HFA) 108 (90 Base) MCG/ACT inhaler Inhale 1-2 puffs into the lungs every 4 hours as needed      calcium carbonate (TUMS) 500 MG chewable tablet Take 1 chew tab by mouth 3 times daily as needed for heartburn      diclofenac (VOLTAREN) 1 % topical gel APPLY 4 GRAMS TOPICALLY TO BOTH KNEES TWICE DAILY 200 g 11    DULoxetine (CYMBALTA) 30 MG capsule Take 1 capsule (30 mg) by mouth daily 90 capsule 3    Lidocaine (LIDOCARE) 4 % Patch Place 1 patch onto the skin every 24 hours (apply to neck---on 12 hours and off 12 hours) 30 patch 11    melatonin 3 MG tablet Take 1 tablet (3 mg) by mouth At Bedtime 90 tablet 3    mirabegron (MYRBETRIQ) 25 MG 24 hr tablet Take 1 tablet (25 mg) by mouth daily 90 tablet 3    Multiple Vitamin (MULTIVITAMIN ADULT) TABS Take 1 tablet by mouth daily 90 tablet 3    omeprazole (PRILOSEC) 20 MG DR capsule Take 1 capsule (20 mg) by mouth daily 90 capsule 3    polyethylene glycol 3350 POWD Take 17 g by mouth every 48 hours      polyvinyl alcohol (LIQUIFILM TEARS) 1.4 % ophthalmic solution Place 1 drop into both eyes every 4 hours as needed for dry eyes      senna (SENOKOT) 8.6 MG tablet Take 8.6 mg by mouth 2 times daily as needed      tamsulosin (FLOMAX) 0.4 MG capsule Take 1 capsule (0.4 mg) by mouth daily 90 capsule 3    vitamin B-12 (CYANOCOBALAMIN) 1000 MCG tablet Take 1 tablet (1,000 mcg) by mouth daily 90 tablet 3    Vitamin D3 (CHOLECALCIFEROL) 25 mcg (1000 units) tablet Take 1 tablet (25 mcg) by mouth daily 90 tablet 3          Controlled medications:   not applicable/none     Past Medical History:   Past Medical History:   Diagnosis Date    Anxiety     Chronic osteoarthritis     COPD (chronic obstructive pulmonary disease) (H)     Depressive disorder     Gastroesophageal reflux disease      Physical Exam:   Vitals: /65   Pulse 53   Temp 97.6  F (36.4  C)   Resp 19   Wt 76.2 kg (168 lb)   BMI: There is no height or weight on file to calculate  BMI.  GENERAL APPEARANCE:  Alert, in no distress, cooperative  RESP:  respiratory effort and palpation of chest normal, lungs clear to auscultation , no respiratory distress, on room air, no cough  CV:  Palpation and auscultation of heart done , regular rate and rhythm, no murmur, rub, or gallop, 1+ BLLE edema  ABDOMEN:  no guarding or rebound, bowel sounds normal, no organomegaly  M/S:   Gait and station abnormal primarily w/c bound, tenderness to palpation of cervical spine, ROM to bilat knees impaired  NEURO:   speech clear, no tremor, normal strength and tone  PSYCH:  oriented X 3, memory impaired , affect and mood normal    SNF labs: Recent labs in [facility] reviewed by me today.     DISCHARGE PLAN:  Follow up labs: No labs orders/due  Medical Follow Up:      Follow up with primary care provider in 1-2 weeks  [facility] scheduled appointments:   none  Discharge Services: No therapy or home care recommended.     TOTAL DISCHARGE TIME:   Greater than 30 minutes  Electronically signed by:  SHERI Hill CNP

## 2023-10-30 NOTE — LETTER
10/30/2023        RE: Jacob Boland  [address]  [facility] [address]        [facility] GERIATRICS DISCHARGE SUMMARY  PATIENT'S NAME: Jacob Boland  YOB: 1933  MEDICAL RECORD NUMBER:  [id number]  Place of Service where encounter took place:  [facility] ([facility]) [[id number]]    PRIMARY CARE PROVIDER AND CLINIC RESPONSIBLE AFTER TRANSFER:   SHERI Hill CNP, [address] [facility] [address]    Nursing Home: [facility] ([facility])     Transferring providers: SHERI Hill CNP, Dr. Genoveva Elizabeth MD  Recent Hospitalization/ED:   no recent hospitalization or ER visits    Date of SNF Admission:  July 2023  Date of SNF (anticipated) Discharge:  sometime this week  Discharged to: new skilled nursing facility   Cognitive Scores: 4/5.6  Physical Function: Wheelchair dependent  DME: No new DME needed    CODE STATUS/ADVANCE DIRECTIVES DISCUSSION:  No CPR- Do NOT Intubate   ALLERGIES: Atorvastatin, Naproxen, Pravastatin, and Simvastatin    NURSING FACILITY COURSE   Medication Changes/Rationale:   None recently made    Summary of nursing facility stay:   Patient is a 89 year male with PMH significant for COPD, depression/anxiety, BPH, DDD with cervical neck pain, OA, cognitive impairment, HLD, frequent falls, and vitamin B deficiency.  He was most recently hospitalized at [facility] from 4/24-4/26 after a fall at home.  He was found by EMS confused an agitated.  He did rehab at TCU and did well but no longer safe for him to be home on his own, so admitting to AL facility in July 2023.  [facility]  He is discharging to care center temporarily due to financial reasons with goal to get on a  waiver and move back into another AL.  He is see in his apartment today and very confused and worried why he needs to move.  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 behavioral disturbance, psychotic disturbance, mood disturbance, or anxiety, unspecified dementia type (H)  Comment: CPT 4/5.6  -no longer safe to live alone  -daughter in [place] is HCA, daughter-in-law in area also helps with cares  -noted delirium when patient was last hospitalized  Plan:now moving to LTC due to funds, staff assist with cares, dipense meds       (N40.1,  R35.1) Benign prostatic hyperplasia with nocturia  Comment: long-standing wakes up 4 times per night but reports falls back asleep and not bothering him  Plan: continue flomax and myrbetriq    (M15.9) Primary osteoarthritis involving multiple joints  Comment: ongoing cervical neck pain, has improved with lidocaine and APAP  -was followed by Dr. German and recommended repeat imagaging MRI if fall with trauma or changes in motor fx or sensation  Plan: continue APAP, lidocaine  -[facility]    (F33.9) Recurrent major depressive disorder, remission status unspecified (H)  Comment:reports mood ok, recent big change moving from home to AL facility  Plan: continue Cymbalta, staff to update with concerns    Idiopathic neuropathy  Comment: contributes to falls, denies pain  Plan: primarily w/c bound, staff assist with transfers    (N40.0) Benign prostatic hyperplasia, unspecified whether lower urinary tract symptoms present  Comment: frequent urination at night  -on tamsulosin, end of Sept Dr. Glenn hernándezabegron to see if symptoms improved  Plan: tamsulosin  -follow with urology per preference      Discharge Medications:  MED REC REQUIRED  Post Medication Reconciliation Status:  Patient was not discharged from an inpatient facility or TCU     Current Outpatient Medications   Medication Sig Dispense Refill     acetaminophen (TYLENOL) 500 MG tablet  Take 2 tablets (1,000 mg) by mouth 3 times daily 540 tablet 3     albuterol (PROAIR HFA/PROVENTIL HFA/VENTOLIN HFA) 108 (90 Base) MCG/ACT inhaler Inhale 1-2 puffs into the lungs every 4 hours as needed       calcium carbonate (TUMS) 500 MG chewable tablet Take 1 chew tab by mouth 3 times daily as needed for heartburn       diclofenac (VOLTAREN) 1 % topical gel APPLY 4 GRAMS TOPICALLY TO BOTH KNEES TWICE DAILY 200 g 11     DULoxetine (CYMBALTA) 30 MG capsule Take 1 capsule (30 mg) by mouth daily 90 capsule 3     Lidocaine (LIDOCARE) 4 % Patch Place 1 patch onto the skin every 24 hours (apply to neck---on 12 hours and off 12 hours) 30 patch 11     melatonin 3 MG tablet Take 1 tablet (3 mg) by mouth At Bedtime 90 tablet 3     mirabegron (MYRBETRIQ) 25 MG 24 hr tablet Take 1 tablet (25 mg) by mouth daily 90 tablet 3     Multiple Vitamin (MULTIVITAMIN ADULT) TABS Take 1 tablet by mouth daily 90 tablet 3     omeprazole (PRILOSEC) 20 MG DR capsule Take 1 capsule (20 mg) by mouth daily 90 capsule 3     polyethylene glycol 3350 POWD Take 17 g by mouth every 48 hours       polyvinyl alcohol (LIQUIFILM TEARS) 1.4 % ophthalmic solution Place 1 drop into both eyes every 4 hours as needed for dry eyes       senna (SENOKOT) 8.6 MG tablet Take 8.6 mg by mouth 2 times daily as needed       tamsulosin (FLOMAX) 0.4 MG capsule Take 1 capsule (0.4 mg) by mouth daily 90 capsule 3     vitamin B-12 (CYANOCOBALAMIN) 1000 MCG tablet Take 1 tablet (1,000 mcg) by mouth daily 90 tablet 3     Vitamin D3 (CHOLECALCIFEROL) 25 mcg (1000 units) tablet Take 1 tablet (25 mcg) by mouth daily 90 tablet 3          Controlled medications:   not applicable/none     Past Medical History:   Past Medical History:   Diagnosis Date     Anxiety      Chronic osteoarthritis      COPD (chronic obstructive pulmonary disease) (H)      Depressive disorder      Gastroesophageal reflux disease      Physical Exam:   Vitals: /65   Pulse 53   Temp 97.6  F (36.4  C)    Resp 19   Wt 76.2 kg (168 lb)   BMI: There is no height or weight on file to calculate BMI.  GENERAL APPEARANCE:  Alert, in no distress, cooperative  RESP:  respiratory effort and palpation of chest normal, lungs clear to auscultation , no respiratory distress, on room air, no cough  CV:  Palpation and auscultation of heart done , regular rate and rhythm, no murmur, rub, or gallop, 1+ BLLE edema  ABDOMEN:  no guarding or rebound, bowel sounds normal, no organomegaly  M/S:   Gait and station abnormal primarily w/c bound, tenderness to palpation of cervical spine, ROM to bilat knees impaired  NEURO:   speech clear, no tremor, normal strength and tone  PSYCH:  oriented X 3, memory impaired , affect and mood normal    SNF labs: Recent labs in [facility] reviewed by me today.     DISCHARGE PLAN:  Follow up labs: No labs orders/due  Medical Follow Up:      Follow up with primary care provider in 1-2 weeks  [facility] scheduled appointments:   none  Discharge Services: No therapy or home care recommended.     TOTAL DISCHARGE TIME:   Greater than 30 minutes  Electronically signed by:  SHERI Hill CNP              Sincerely,        SHERI Hill CNP

## 2023-10-30 NOTE — Clinical Note
Kevin Gonzales,  This patient is supposed to discharge sometime this week to the care center.  It was abrupt and not anticipated.  He was pretty confused and sad about it.  It is supposed to be temporary until they can get him on a  waiver and discharge back to an AL  I only saw him once.  He is a nice dennise. Let me know if any questions Thanks! Reginaldo

## 2023-11-02 NOTE — LETTER
11/2/2023        RE: Jacob Boland  [address]  [facility] [address]        No notes on file      Sincerely,        SHERI Wong CNP

## 2023-11-02 NOTE — PROGRESS NOTES
[facility] GERIATRICS    PRIMARY CARE PROVIDER AND CLINIC:  SHERI Wong CNP, [address] [facility] [place] [address]  Chief Complaint   Patient presents with    [facility] Medical Record Number:  [id number]  Place of Service where encounter took place:  [facility]  () [[id number]]    Jacob Boland  is a 89 year old  (1/11/1933), admitted to the above facility from [facility]..     By chart review, most [place]24 [place] [facility] [place].  Found down by EMS confused and agitated, completed rehab in TCU but was unable to return to previous living conditions so admitted to AL in July 2023.  He has been fairly stable since, now admitting to long-term care for financial reasons.  Awaiting CADI waiver to return to AL setting.    HPI:    Seen today up on the unit in AL and later in his room.  He is anxious about relocation, frustrated about his need to be in long-term care, responds well to redirection and reassurance.  He has concerns about compatibility with his roommate and  is aware.  His daughter assists with decision-making, as she lives out of state.  Would like him as needed Tylenol dose for chronic back pain.  He is denying acute physical concerns including chest pain, shortness of breath, changes in bowel or bladder habits, fever/chills, dizziness or lightheadedness.    CODE STATUS/ADVANCE DIRECTIVES DISCUSSION:  No CPR- Do NOT Intubate  DNR / DNI  ALLERGIES:   Allergies   Allergen Reactions    Atorvastatin Other (See Comments) and Unknown    Naproxen      Other reaction(s): Stomach Upset, Unknown    Pravastatin Muscle Pain (Myalgia)    Simvastatin      Other reaction(s): Arthralgia, Unknown      PAST MEDICAL HISTORY:   Past Medical History:   Diagnosis Date    Anxiety     Chronic osteoarthritis     COPD (chronic obstructive pulmonary disease) (H)     Depressive disorder     Gastroesophageal reflux disease        PAST SURGICAL HISTORY:   has a past surgical history that includes joint replacement, hip rt/lt (Right) and Lumbar Laminectomy (2006).  FAMILY HISTORY: family history includes Alzheimer Disease in his brother; Cancer in his father; Myocardial Infarction in his mother.  SOCIAL HISTORY:   reports that he has never smoked. He has never used smokeless tobacco.  Patient's living condition: lives in a skilled nursing facility    Post Discharge Medication Reconciliation Status:   MED REC REQUIRED  Post Medication Reconciliation Status:  Discharge medications reconciled, continue medications without change       Current Outpatient Medications   Medication Sig    acetaminophen (TYLENOL) 500 MG tablet Take 2 tablets (1,000 mg) by mouth 3 times daily    albuterol (PROAIR HFA/PROVENTIL HFA/VENTOLIN HFA) 108 (90 Base) MCG/ACT inhaler Inhale 1-2 puffs into the lungs every 4 hours as needed    calcium carbonate (TUMS) 500 MG chewable tablet Take 1 chew tab by mouth 3 times daily as needed for heartburn    diclofenac (VOLTAREN) 1 % topical gel APPLY 4 GRAMS TOPICALLY TO BOTH KNEES TWICE DAILY    DULoxetine (CYMBALTA) 30 MG capsule Take 1 capsule (30 mg) by mouth daily    Lidocaine (LIDOCARE) 4 % Patch Place 1 patch onto the skin every 24 hours (apply to neck---on 12 hours and off 12 hours)    melatonin 3 MG tablet Take 1 tablet (3 mg) by mouth At Bedtime    mirabegron (MYRBETRIQ) 25 MG 24 hr tablet Take 1 tablet (25 mg) by mouth daily    Multiple Vitamin (MULTIVITAMIN ADULT) TABS Take 1 tablet by mouth daily    omeprazole (PRILOSEC) 20 MG DR capsule Take 1 capsule (20 mg) by mouth daily    polyethylene glycol 3350 POWD Take 17 g by mouth every 48 hours    polyvinyl alcohol (LIQUIFILM TEARS) 1.4 % ophthalmic solution Place 1 drop into both eyes every 4 hours as needed for dry eyes    senna (SENOKOT) 8.6 MG tablet Take 8.6 mg by mouth 2 times daily as needed    tamsulosin (FLOMAX) 0.4 MG capsule Take 1 capsule (0.4 mg) by mouth  daily    vitamin B-12 (CYANOCOBALAMIN) 1000 MCG tablet Take 1 tablet (1,000 mcg) by mouth daily    Vitamin D3 (CHOLECALCIFEROL) 25 mcg (1000 units) tablet Take 1 tablet (25 mcg) by mouth daily     No current facility-administered medications for this visit.       ROS:  Limited secondary to cognitive impairment but today pt reports the above    Vitals:  /68   Pulse 68   Temp 97.8  F (36.6  C)   Resp 18   Wt 80.3 kg (177 lb)   SpO2 95%   Exam:  GENERAL APPEARANCE:  Alert, anxious  RESP:  respiratory effort and palpation of chest normal, lungs clear to auscultation , no respiratory distress  CV:  Palpation and auscultation of heart done , regular rate and rhythm, no murmur, rub, or gallop, no edema  ABDOMEN:  normal bowel sounds, soft, nontender, no hepatosplenomegaly or other masses  M/S:   Gait and station abnormal transfers with assist, pedaling wheelchair for mobility  SKIN:  Inspection of skin and subcutaneous tissue baseline, Palpation of skin and subcutaneous tissue baseline  NEURO:   MA freely, follows commands  PSYCH:  memory impaired , repetitive    Lab/Diagnostic data:  Labs done in SNF are in [facility]. Please refer to them using [facility] Everywhere. and Recent labs in [facility] reviewed by me today.     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  Reportedly doing well in AL setting, now in long-term care due to financial reasons.  Daughter assists with decision-making  Plan: SNF for assist with ADLs, medication management, meals, activities as needed.   following for discharge planning.  PT/OT to  screen    (N40.1,  R35.1) Benign prostatic hyperplasia with nocturia  Comment: Chronic, longstanding.  Plan: Continue Flomax, mirabegron, follow-up with urology per recommendations.  Recheck BMP    (M15.9) Primary osteoarthritis involving multiple joints  Comment: Chronic, with history of neck pain.  Wears lidocaine patch.  Will adjust APAP orders so as needed dose is available.  Plan: Discontinue APAP.  Start Tylenol 650 mg 3 times daily and once daily as needed for pain.  Continue lidocaine, diclofenac, [facility] per [facility].  Vitamin D for bone health    (F33.9) Recurrent major depressive disorder, remission status unspecified (H24)  (F43.22) Adjustment disorder with anxious mood  Comment: Chronic depression, recent exacerbation related to relocation.  Responds well to redirection and reassurance today  - Management reviewed with nursing facility staff today, , relayed roommate complaints  Plan: Continue Cymbalta, melatonin for sleep, monitor mood, ACP as needed    (G60.9) Idiopathic neuropathy  Comment: Chronic, with history of related falls  Plan: Wheelchair for mobility, staff assist as needed    (D64.9) Anemia, unspecified type  Comment: Chronic, mild with B12 deficiency.  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

## 2023-11-17 NOTE — LETTER
11/17/2023        RE: Jacob Boland  [address]  [facility] [address]        No notes on file      Sincerely,        Genoveva Elizabeth MD

## 2023-12-05 NOTE — PROGRESS NOTES
"[facility] GERIATRICS  Chief Complaint   Patient presents with    [facility] Medical Record Number:  [id number]  Place of Service where encounter took place:  No question data found.    HPI:    Jacob Boland  is 89 year old (1/11/1933), who is being seen today for a federally mandated E/M visit.     By chart review, patient has a history of COPD, depression/anxiety, BPH, DDD, OA, cognitive impairment, HLD, frequent falls and vitamin B deficiency.  Most recent hospitalization 4/24 - 4/26 at [facility] following a fall.  Found down by EMS confused and agitated, completed rehab in TCU but was unable to return to previous living conditions so admitted to AL in July 2023.  He has been fairly stable since, now residing in long-term care for financial reasons.  Awaiting CADI waiver to return to AL setting.     Today's concerns are:  Seen today for routine follow-up in long-term care after wheelchair in his room.  Also at nursing request, per nursing report he was sitting on the toilet this morning with staff assisting and a staff member laying down as he was sitting up causing the two to bump heads.  Neurochecks have been initiated.  Today, denies acute concerns, headache, changes in vision, dizziness or lightheadedness.  Reportedly a small red spot on his head initially following trauma, resolved on exam.  \"Everything is the same, \"he says.  Offers limited additional history.  He would like something to eat.  He denies chest pain, shortness of breath, changes of bowel or bladder habits, fever/chills.    ALLERGIES:Atorvastatin, Naproxen, Pravastatin, and Simvastatin  PAST MEDICAL HISTORY:   Past Medical History:   Diagnosis Date    Anxiety     Chronic osteoarthritis     COPD (chronic obstructive pulmonary disease) (H)     Depressive disorder     Gastroesophageal reflux disease      PAST SURGICAL HISTORY:   has a past surgical history that includes joint replacement, hip rt/lt (Right) and " Lumbar Laminectomy (2006).  FAMILY HISTORY: family history includes Alzheimer Disease in his brother; Cancer in his father; Myocardial Infarction in his mother.  SOCIAL HISTORY:  reports that he has never smoked. He has never used smokeless tobacco.    MEDICATIONS:  MED REC REQUIRED  Post Medication Reconciliation Status:  Medication reconciliation previously completed during another office visit         Review of your medicines            Accurate as of December 5, 2023 11:59 PM. If you have any questions, ask your nurse or doctor.                CONTINUE these medicines which have NOT CHANGED        Dose / Directions   acetaminophen 500 MG tablet  Commonly known as: TYLENOL  Used for: Pain      Dose: 1,000 mg  Take 2 tablets (1,000 mg) by mouth 3 times daily  Quantity: 540 tablet  Refills: 3     albuterol 108 (90 Base) MCG/ACT inhaler  Commonly known as: PROAIR HFA/PROVENTIL HFA/VENTOLIN HFA      Dose: 1-2 puff  Inhale 1-2 puffs into the lungs every 4 hours as needed  Refills: 0     calcium carbonate 500 MG chewable tablet  Commonly known as: TUMS      Dose: 1 chew tab  Take 1 chew tab by mouth 3 times daily as needed for heartburn  Refills: 0     diclofenac 1 % topical gel  Commonly known as: VOLTAREN  Used for: Chronic pain of both knees      APPLY 4 GRAMS TOPICALLY TO BOTH KNEES TWICE DAILY  Quantity: 200 g  Refills: 11     DULoxetine 30 MG capsule  Commonly known as: CYMBALTA  Used for: Recurrent major depressive disorder, remission status unspecified (H24)      Dose: 30 mg  Take 1 capsule (30 mg) by mouth daily  Quantity: 90 capsule  Refills: 3     Lidocaine 4 % Patch  Commonly known as: LIDOCARE  Used for: Pain      Dose: 1 patch  Place 1 patch onto the skin every 24 hours (apply to neck---on 12 hours and off 12 hours)  Quantity: 30 patch  Refills: 11     melatonin 3 MG tablet  Used for: Insomnia      Dose: 3 mg  Take 1 tablet (3 mg) by mouth At Bedtime  Quantity: 90 tablet  Refills: 3     mirabegron 25 MG  24 hr tablet  Commonly known as: MYRBETRIQ  Used for: BPH (benign prostatic hyperplasia)      Dose: 25 mg  Take 1 tablet (25 mg) by mouth daily  Quantity: 90 tablet  Refills: 3     Multivitamin Adult Tabs  Used for: Vitamin deficiency      Dose: 1 tablet  Take 1 tablet by mouth daily  Quantity: 90 tablet  Refills: 3     omeprazole 20 MG DR capsule  Commonly known as: PriLOSEC  Used for: GERD (gastroesophageal reflux disease)      Dose: 20 mg  Take 1 capsule (20 mg) by mouth daily  Quantity: 90 capsule  Refills: 3     polyethylene glycol 3350 Powd      Dose: 17 g  Take 17 g by mouth every 48 hours  Refills: 0     polyvinyl alcohol 1.4 % ophthalmic solution  Commonly known as: LIQUIFILM TEARS      Dose: 1 drop  Place 1 drop into both eyes every 4 hours as needed for dry eyes  Refills: 0     senna 8.6 MG tablet  Commonly known as: SENOKOT      Dose: 8.6 mg  Take 8.6 mg by mouth 2 times daily as needed  Refills: 0     tamsulosin 0.4 MG capsule  Commonly known as: FLOMAX  Used for: BPH (benign prostatic hyperplasia)      Dose: 0.4 mg  Take 1 capsule (0.4 mg) by mouth daily  Quantity: 90 capsule  Refills: 3     vitamin B-12 1000 MCG tablet  Commonly known as: CYANOCOBALAMIN  Used for: Vitamin deficiency      Dose: 1,000 mcg  Take 1 tablet (1,000 mcg) by mouth daily  Quantity: 90 tablet  Refills: 3     Vitamin D3 25 mcg (1000 units) tablet  Commonly known as: CHOLECALCIFEROL  Used for: Recurrent major depressive disorder, remission status unspecified (H24)      Dose: 1 tablet  Take 1 tablet (25 mcg) by mouth daily  Quantity: 90 tablet  Refills: 3               Case Management:  I have reviewed the care plan and MDS and do agree with the plan. Patient's desire to return to the community is  present, working with  . Information reviewed:  Medications, vital signs, orders, and nursing notes.    ROS:  Limited secondary to cognitive impairment but today pt reports the above and 4 point ROS including Respiratory,  "CV, GI and , other than that noted in the HPI,  is negative    Vitals:  /69   Pulse 79   Temp 97.8  F (36.6  C)   Resp 18   Ht 1.753 m (5' 9\")   Wt 81.2 kg (179 lb)   SpO2 96%   BMI 26.43 kg/m    Body mass index is 26.43 kg/m .  Exam:  GENERAL APPEARANCE:  Alert, in no distress, head is atraumatic  RESP:  respiratory effort and palpation of chest normal, lungs clear to auscultation , no respiratory distress  CV:  Palpation and auscultation of heart done , regular rate and rhythm, no murmur, rub, or gallop, no edema  ABDOMEN:  normal bowel sounds, soft, nontender, no hepatosplenomegaly or other masses  M/S:   Gait and station abnormal transfers with assist, wheelchair for mobility  SKIN:  Inspection of skin and subcutaneous tissue baseline, Palpation of skin and subcutaneous tissue baseline  NEURO:   Cranial nerves grossly intact, follows commands, moves all extremities freely  PSYCH:  memory impaired , affect and mood normal    Lab/Diagnostic data:   Labs done in SNF are in [facility]. Please refer to them using [employer] Everywhere. and Recent labs in EPIC reviewed by me today.     ASSESSMENT/PLAN  (S09.90XA) Traumatic injury of head, initial encounter  (primary encounter diagnosis)  Comment: Acute, mild head-to-head trauma with nursing staff today.  Neuro's WDL, head atraumatic.  - Management reviewed with nursing facility staff today  Plan: Continue monitoring per facility protocol     (J44.9) Chronic obstructive pulmonary disease, unspecified COPD type (H)  (primary encounter diagnosis)  Comment: Chronic, without acute exacerbation  Plan: Continue albuterol as needed, monitor respiratory status     (F03.B0) Moderate dementia without behavioral disturbance, psychotic disturbance, mood disturbance, or anxiety, unspecified dementia type (H)  Comment: Chronic, STML and limited insight.  CPT 4.0/5.6 and June 2023.  Daughter assists with decision-making  Plan: SNF for assist with ADLs, medication " management, meals, activities as needed.   following for discharge planning.  PT/OT to screen     (N40.1,  R35.1) Benign prostatic hyperplasia with nocturia  Comment: Chronic, longstanding.  Denied symptoms  Plan: Continue Flomax, mirabegron, follow-up with urology per recommendations.  Recheck BMP     (M15.9) Primary osteoarthritis involving multiple joints  Comment: Chronic, with history of neck pain.  Wears lidocaine patch.  Plan:  Start Tylenol 650 mg 3 times daily and once daily as needed for pain.  Continue lidocaine, diclofenac, [facility].  Vitamin D for bone health     (F33.9) Recurrent major depressive disorder, remission status unspecified (H24)  (F43.22) Adjustment disorder with anxious mood  Comment: Chronic depression, mood waxes and wanes, anxious  Plan: Continue Cymbalta, melatonin for sleep, monitor mood, ACP as needed     (G60.9) Idiopathic neuropathy  Comment: Chronic, with history of related falls  Plan: Wheelchair for mobility, staff assist as needed     (D64.9) Anemia, unspecified type  Comment: Chronic, mild with B12 deficiency.  Hemoglobin baseline 11  Hemoglobin   Date Value Ref Range Status   12/06/2023 12.0 (L) 13.3 - 17.7 g/dL Final   11/08/2023 11.9 (L) 13.3 - 17.7 g/dL Final       Plan: Continue oral B12, monitor hemoglobin periodically, recheck CBC     (K21.9) Gastroesophageal reflux disease, unspecified whether esophagitis present  Comment: Chronic, ongoing.  Will decrease PPI pharmacy recommendation  Plan: Continue PPI every other day, Tums 3 times daily as needed     (K59.01) Slow transit constipation  Comment: Chronic, ongoing  Plan: Continue senna twice daily as needed, MiraLAX every other day, monitor bowel habits per nursing      Electronically signed by:  SHERI Wong CNP

## 2023-12-05 NOTE — LETTER
"    12/5/2023        RE: Jacob Boland  [address]  [facility] [address]        [facility] GERIATRICS  Chief Complaint   Patient presents with     skilled nursing Regulatory     [place] Medical Record Number:  [id number]  Place of Service where encounter took place:  No question data found.    HPI:    Jacob Boland  is 89 year old (1/11/1933), who is being seen today for a federally mandated E/M visit.     By chart review, patient has a history of COPD, depression/anxiety, BPH, DDD, OA, cognitive impairment, HLD, frequent falls and vitamin B deficiency.  Most recent hospitalization 4/24 - 4/26 at [facility] following a fall.  Found down by EMS confused and agitated, completed rehab in TCU but was unable to return to previous living conditions so admitted to AL in July 2023.  He has been fairly stable since, now residing in long-term care for financial reasons.  Awaiting CADI waiver to return to AL setting.     Today's concerns are:  Seen today for routine follow-up in long-term care after wheelchair in his room.  Also at nursing request, per nursing report he was sitting on the toilet this morning with staff assisting and a staff member laying down as he was sitting up causing the two to bump heads.  Neurochecks have been initiated.  Today, denies acute concerns, headache, changes in vision, dizziness or lightheadedness.  Reportedly a small red spot on his head initially following trauma, resolved on exam.  \"Everything is the same, \"he says.  Offers limited additional history.  He would like something to eat.  He denies chest pain, shortness of breath, changes of bowel or bladder habits, fever/chills.    ALLERGIES:Atorvastatin, Naproxen, Pravastatin, and Simvastatin  PAST MEDICAL HISTORY:   Past Medical History:   Diagnosis Date     Anxiety      Chronic osteoarthritis      COPD (chronic obstructive pulmonary disease) (H)      Depressive disorder      Gastroesophageal reflux disease      PAST SURGICAL " HISTORY:   has a past surgical history that includes joint replacement, hip rt/lt (Right) and Lumbar Laminectomy (2006).  FAMILY HISTORY: family history includes Alzheimer Disease in his brother; Cancer in his father; Myocardial Infarction in his mother.  SOCIAL HISTORY:  reports that he has never smoked. He has never used smokeless tobacco.    MEDICATIONS:  MED REC REQUIRED  Post Medication Reconciliation Status:  Medication reconciliation previously completed during another office visit         Review of your medicines            Accurate as of December 5, 2023 11:59 PM. If you have any questions, ask your nurse or doctor.                CONTINUE these medicines which have NOT CHANGED        Dose / Directions   acetaminophen 500 MG tablet  Commonly known as: TYLENOL  Used for: Pain      Dose: 1,000 mg  Take 2 tablets (1,000 mg) by mouth 3 times daily  Quantity: 540 tablet  Refills: 3     albuterol 108 (90 Base) MCG/ACT inhaler  Commonly known as: PROAIR HFA/PROVENTIL HFA/VENTOLIN HFA      Dose: 1-2 puff  Inhale 1-2 puffs into the lungs every 4 hours as needed  Refills: 0     calcium carbonate 500 MG chewable tablet  Commonly known as: TUMS      Dose: 1 chew tab  Take 1 chew tab by mouth 3 times daily as needed for heartburn  Refills: 0     diclofenac 1 % topical gel  Commonly known as: VOLTAREN  Used for: Chronic pain of both knees      APPLY 4 GRAMS TOPICALLY TO BOTH KNEES TWICE DAILY  Quantity: 200 g  Refills: 11     DULoxetine 30 MG capsule  Commonly known as: CYMBALTA  Used for: Recurrent major depressive disorder, remission status unspecified (H24)      Dose: 30 mg  Take 1 capsule (30 mg) by mouth daily  Quantity: 90 capsule  Refills: 3     Lidocaine 4 % Patch  Commonly known as: LIDOCARE  Used for: Pain      Dose: 1 patch  Place 1 patch onto the skin every 24 hours (apply to neck---on 12 hours and off 12 hours)  Quantity: 30 patch  Refills: 11     melatonin 3 MG tablet  Used for: Insomnia      Dose: 3  mg  Take 1 tablet (3 mg) by mouth At Bedtime  Quantity: 90 tablet  Refills: 3     mirabegron 25 MG 24 hr tablet  Commonly known as: MYRBETRIQ  Used for: BPH (benign prostatic hyperplasia)      Dose: 25 mg  Take 1 tablet (25 mg) by mouth daily  Quantity: 90 tablet  Refills: 3     Multivitamin Adult Tabs  Used for: Vitamin deficiency      Dose: 1 tablet  Take 1 tablet by mouth daily  Quantity: 90 tablet  Refills: 3     omeprazole 20 MG DR capsule  Commonly known as: PriLOSEC  Used for: GERD (gastroesophageal reflux disease)      Dose: 20 mg  Take 1 capsule (20 mg) by mouth daily  Quantity: 90 capsule  Refills: 3     polyethylene glycol 3350 Powd      Dose: 17 g  Take 17 g by mouth every 48 hours  Refills: 0     polyvinyl alcohol 1.4 % ophthalmic solution  Commonly known as: LIQUIFILM TEARS      Dose: 1 drop  Place 1 drop into both eyes every 4 hours as needed for dry eyes  Refills: 0     senna 8.6 MG tablet  Commonly known as: SENOKOT      Dose: 8.6 mg  Take 8.6 mg by mouth 2 times daily as needed  Refills: 0     tamsulosin 0.4 MG capsule  Commonly known as: FLOMAX  Used for: BPH (benign prostatic hyperplasia)      Dose: 0.4 mg  Take 1 capsule (0.4 mg) by mouth daily  Quantity: 90 capsule  Refills: 3     vitamin B-12 1000 MCG tablet  Commonly known as: CYANOCOBALAMIN  Used for: Vitamin deficiency      Dose: 1,000 mcg  Take 1 tablet (1,000 mcg) by mouth daily  Quantity: 90 tablet  Refills: 3     Vitamin D3 25 mcg (1000 units) tablet  Commonly known as: CHOLECALCIFEROL  Used for: Recurrent major depressive disorder, remission status unspecified (H24)      Dose: 1 tablet  Take 1 tablet (25 mcg) by mouth daily  Quantity: 90 tablet  Refills: 3               Case Management:  I have reviewed the care plan and MDS and do agree with the plan. Patient's desire to return to the community is  present, working with  . Information reviewed:  Medications, vital signs, orders, and nursing notes.    ROS:  Limited  "secondary to cognitive impairment but today pt reports the above and 4 point ROS including Respiratory, CV, GI and , other than that noted in the HPI,  is negative    Vitals:  /69   Pulse 79   Temp 97.8  F (36.6  C)   Resp 18   Ht 1.753 m (5' 9\")   Wt 81.2 kg (179 lb)   SpO2 96%   BMI 26.43 kg/m    Body mass index is 26.43 kg/m .  Exam:  GENERAL APPEARANCE:  Alert, in no distress, head is atraumatic  RESP:  respiratory effort and palpation of chest normal, lungs clear to auscultation , no respiratory distress  CV:  Palpation and auscultation of heart done , regular rate and rhythm, no murmur, rub, or gallop, no edema  ABDOMEN:  normal bowel sounds, soft, nontender, no hepatosplenomegaly or other masses  M/S:   Gait and station abnormal transfers with assist, wheelchair for mobility  SKIN:  Inspection of skin and subcutaneous tissue baseline, Palpation of skin and subcutaneous tissue baseline  NEURO:   Cranial nerves grossly intact, follows commands, moves all extremities freely  PSYCH:  memory impaired , affect and mood normal    Lab/Diagnostic data:   Labs done in SNF [facility] in [facility]. Please refer to them using [employer] Everywhere. and Recent labs in EPIC reviewed by me today.     ASSESSMENT/PLAN  (S09.90XA) Traumatic injury of head, initial encounter  (primary encounter diagnosis)  Comment: Acute, mild head-to-head trauma with nursing staff today.  Neuro's WDL, head atraumatic.  - Management reviewed with nursing facility staff today  Plan: Continue monitoring per facility protocol     (J44.9) Chronic obstructive pulmonary disease, unspecified COPD type (H)  (primary encounter diagnosis)  Comment: Chronic, without acute exacerbation  Plan: Continue albuterol as needed, monitor respiratory status     (F03.B0) Moderate dementia without behavioral disturbance, psychotic disturbance, mood disturbance, or anxiety, unspecified dementia type (H)  Comment: Chronic, STML and limited insight.  CPT " 4.0/5.6 and June 2023.  Daughter assists with decision-making  Plan: SNF for assist with ADLs, medication management, meals, activities as needed.   following for discharge planning.  PT/OT to screen     (N40.1,  R35.1) Benign prostatic hyperplasia with nocturia  Comment: Chronic, longstanding.  Denied symptoms  Plan: Continue Flomax, mirabegron, follow-up with urology per recommendations.  Recheck BMP     (M15.9) Primary osteoarthritis involving multiple joints  Comment: Chronic, with history of neck pain.  Wears lidocaine patch.  Plan:  Start Tylenol 650 mg 3 times daily and once daily as needed for pain.  Continue lidocaine, diclofenac, [facility].  Vitamin D for bone health     (F33.9) Recurrent major depressive disorder, remission status unspecified (H24)  (F43.22) Adjustment disorder with anxious mood  Comment: Chronic depression, mood waxes and wanes, anxious  Plan: Continue Cymbalta, melatonin for sleep, monitor mood, ACP as needed     (G60.9) Idiopathic neuropathy  Comment: Chronic, with history of related falls  Plan: Wheelchair for mobility, staff assist as needed     (D64.9) Anemia, unspecified type  Comment: Chronic, mild with B12 deficiency.  Hemoglobin baseline 11  Hemoglobin   Date Value Ref Range Status   12/06/2023 12.0 (L) 13.3 - 17.7 g/dL Final   11/08/2023 11.9 (L) 13.3 - 17.7 g/dL Final       Plan: Continue oral B12, monitor hemoglobin periodically, recheck CBC     (K21.9) Gastroesophageal reflux disease, unspecified whether esophagitis present  Comment: Chronic, ongoing.  Will decrease PPI pharmacy recommendation  Plan: Continue PPI every other day, Tums 3 times daily as needed     (K59.01) Slow transit constipation  Comment: Chronic, ongoing  Plan: Continue senna twice daily as needed, MiraLAX every other day, monitor bowel habits per nursing      Electronically signed by:  SHERI Wong CNP          Sincerely,        Janet Long  APRN CNP

## 2023-12-06 NOTE — CONFIDENTIAL NOTE
" [facility] Geriatrics Telephone Encounter    HPI: 89 yo male with dementia. Bumped heads with staff yesterday 12/5.     Reason for Call: Nursing reporting patient requesting to get up several times over night to use the bathroom, needing more staff assistance. Almost unable to bear weight.   New widespread pain; neck, legs, hands, shoulders.   Staff feel he is \"off.\"     Onset: sudden     Symptoms: increased pain, generalized weakness     Interventions Tried: Neuro monitoring, supportive measures     A/P: Acute onset weakness and now c/o generalized pain. Needing staff assist with transfers, minimal WB where usually he can transfer with SBA. Confused at baseline and difficult to get a reliable history. Broad differentials considered, (viral illness, UTI?) Most concerning is his recent head trauma after bumping heads with staff yesterday morning. He is not anticoagulated. Spoke with patient's daughter Gregoria who agreed with recommendation for additional workup done in ED.    Imaging Ordered: No    Labs Ordered: no.    Medication Ordered:  No     Sent to ED:  Yes    Orders  Jacob Boland  1/11/1933     Transfer to [facility] for evaluation and treatment of new weakness, body aches, recent head trauma      SHERI Wong CNP on 12/6/2023 at 9:43 AM    "

## 2023-12-06 NOTE — ED TRIAGE NOTES
Pt arrives from [facility] via EMS after a head injury yesterday. Per Pt and EMS he collided with a staff member and they bumped heads. Pt had headache yesterday. Today patient denies pain. Not on thinners no LOC. Per Pt staff was concerned about increased weakness today, patient denies feeling weak. ABCs intact. AO.     /56   Pulse 70   Resp 20   SpO2 95%        Triage Assessment (Adult)       Row Name 12/06/23 [id number]          Triage Assessment    Airway WDL WDL        Respiratory WDL    Respiratory WDL WDL        Peripheral/Neurovascular WDL    Peripheral Neurovascular WDL WDL

## 2023-12-06 NOTE — ED PROVIDER NOTES
History     Chief Complaint:  Head Injury    The history is provided by the patient.     Jacob Boland is a 89 year old male with history of dementia, hyperlipidemia, and COPD presenting via EMS for evaluation of a head injury. Jacob explains that he hit his head yesterday. He notes one episode of emesis after the injury. He denies headache, chest pain, abdominal pain, diplopia, or vision changes. He denies worse balance or weakness than baseline. He denies numbness in his extremities.     Independent Historian:   None - Patient Only    Review of External Notes:    Geriatrics Phone call note 12/6/23    Medications:    Albuterol  Cymbalta  Myrbetriq  Prilosec  Flomax  Zoloft  Cardura    Past Medical History:    Anxiety  Chronic osteoarthritis  COPD  Depressive disorder  GERD  Moderate dementia without behavioral disturbance  BPH  Cervical DJD  Hyperlipidemia  Lumbosacral spondylosis with radiculopathy  Idiopathic progressive neuropathy  Nontoxic uninodular goiter  Onychomycosis  Osteoarthritis    Past Surgical History:    Joint replacement, hip RT/LT  Lumbar laminectomy  Unlisted procedure humerus/elbow  Colonoscopy x2  Biopsy prostate  Hernia surgery    Physical Exam   Patient Vitals for the past 24 hrs:   BP Temp Temp src Pulse Resp SpO2   12/06/23 1525 -- -- -- -- -- 95 %   12/06/23 1524 (!) 123/97 -- -- 64 -- --   12/06/23 1230 127/63 -- -- 62 -- 98 %   12/06/23 1200 114/60 -- -- 63 -- 95 %   12/06/23 1153 119/56 98.9  F (37.2  C) Oral 70 20 95 %      Physical Exam  Vitals and nursing note reviewed.   Constitutional:       Appearance: He is not diaphoretic.   HENT:      Head: No raccoon eyes, Sanders's sign, right periorbital erythema or left periorbital erythema.      Jaw: No trismus.      Right Ear: Tympanic membrane and ear canal normal. No hemotympanum.      Left Ear: Tympanic membrane and ear canal normal. No hemotympanum.      Nose: Nose normal. No congestion or rhinorrhea.      Mouth/Throat:       Mouth: Mucous membranes are moist.      Pharynx: Oropharynx is clear. No oropharyngeal exudate or posterior oropharyngeal erythema.   Eyes:      General: Lids are normal. No scleral icterus.        Right eye: No discharge.         Left eye: No discharge.      Extraocular Movements:      Right eye: Normal extraocular motion.      Left eye: Normal extraocular motion.      Conjunctiva/sclera: Conjunctivae normal.      Right eye: Right conjunctiva is not injected. No chemosis, exudate or hemorrhage.     Left eye: Left conjunctiva is not injected. No chemosis, exudate or hemorrhage.     Pupils: Pupils are equal, round, and reactive to light.   Cardiovascular:      Rate and Rhythm: Normal rate and regular rhythm.      Pulses: Normal pulses.      Heart sounds: Normal heart sounds. No murmur heard.     No friction rub. No gallop.   Pulmonary:      Effort: Pulmonary effort is normal. No respiratory distress.      Breath sounds: Normal breath sounds. No stridor. No wheezing, rhonchi or rales.   Chest:      Chest wall: No tenderness.   Abdominal:      General: There is no distension.      Tenderness: There is no abdominal tenderness. There is no guarding or rebound.   Musculoskeletal:      Cervical back: No tenderness. No spinous process tenderness or muscular tenderness. Normal range of motion.   Skin:     General: Skin is warm.      Capillary Refill: Capillary refill takes less than 2 seconds.      Coloration: Skin is not cyanotic, jaundiced or pale.      Findings: No bruising or rash.   Neurological:      Mental Status: He is alert. Mental status is at baseline.      GCS: GCS eye subscore is 4. GCS verbal subscore is 5. GCS motor subscore is 6.      Cranial Nerves: No cranial nerve deficit, dysarthria or facial asymmetry.      Sensory: Sensation is intact. No sensory deficit.      Motor: Motor function is intact. No weakness or pronator drift.      Coordination: Coordination is intact. Finger-Nose-Finger Test normal.       Comments: Equal strength bilaterally with myotomes L1-S1, C5-T1.   Psychiatric:         Mood and Affect: Mood normal.         Behavior: Behavior normal.         Thought Content: Thought content normal.           Emergency Department Course     Imaging:  Head CT w/o contrast   Final Result   IMPRESSION:      1. No evidence of acute intracranial hemorrhage, mass, or herniation.   2. Moderate diffuse parenchymal volume loss and white matter changes   likely due to chronic microvascular ischemic disease.       JONATHAN ALBERTS MD            SYSTEM ID:  [id number]         Laboratory:  Labs Ordered and Resulted from Time of ED Arrival to Time of ED Departure   ROUTINE UA WITH MICROSCOPIC REFLEX TO CULTURE - Abnormal       Result Value    Color Urine Light Yellow      Appearance Urine Clear      Glucose Urine Negative      Bilirubin Urine Negative      Ketones Urine Negative      Specific Gravity Urine 1.008      Blood Urine Negative      pH Urine 7.0      Protein Albumin Urine Negative      Urobilinogen Urine Normal      Nitrite Urine Negative      Leukocyte Esterase Urine Negative      RBC Urine 1      WBC Urine 3      Squamous Epithelials Urine <1      Hyaline Casts Urine 3 (*)    BASIC METABOLIC PANEL - Abnormal    Sodium 138      Potassium 4.9      Chloride 101      Carbon Dioxide (CO2) 30 (*)     Anion Gap 7      Urea Nitrogen 16.2      Creatinine 0.81      GFR Estimate 84      Calcium 8.5      Glucose 96     CBC WITH PLATELETS AND DIFFERENTIAL - Abnormal    WBC Count 7.1      RBC Count 4.06 (*)     Hemoglobin 12.0 (*)     Hematocrit 37.4 (*)     MCV 92      MCH 29.6      MCHC 32.1      RDW 14.0      Platelet Count 196      % Neutrophils 59      % Lymphocytes 26      % Monocytes 10      % Eosinophils 4      % Basophils 1      % Immature Granulocytes 0      NRBCs per 100 WBC 0      Absolute Neutrophils 4.2      Absolute Lymphocytes 1.8      Absolute Monocytes 0.7      Absolute Eosinophils 0.3      Absolute Basophils 0.0       Absolute Immature Granulocytes 0.0      Absolute NRBCs 0.0     INFLUENZA A/B, RSV, & SARS-COV2 PCR - Normal    Influenza A PCR Negative      Influenza B PCR Negative      RSV PCR Negative      SARS CoV2 PCR Negative          Procedures     Emergency Department Course & Assessments:       Interventions:  Medications   sodium chloride 0.9% BOLUS 1,000 mL (0 mLs Intravenous Stopped 12/6/23 1518)        Assessments:  1208 I obtained history and examined the patient as noted above.     Independent Interpretation (X-rays, CTs, rhythm strip):      Consultations/Discussion of Management or Tests:     ED Course as of 12/06/23 2011   Wed Dec 06, 2023   1344 Spoke with Daughter. She reports baseline non ambulatory and pivots with transfers. She prefers no further workup aside from what has already been completed. Will still get UA which she is okay with. Does not feel she wants him admitted if there is no further findings that require admission.   1352 PERC Rule for Pulmonary Embolism from [email or web address]  on 12/6/2023  ** All calculations should be rechecked by clinician prior to use **    RESULT SUMMARY:  1 criteria  If any criteria are positive, the PERC rule cannot be used to rule out PE in this patient.      INPUTS:  Age =50 -> 1 = Yes  HR =100 -> 0 = No  O2 sat on room air  -> 0 = No  Unilateral leg swelling -> 0 = No  Hemoptysis -> 0 = No  Recent surgery or trauma -> 0 = No  Prior PE or DVT -> 0 = No  Hormone use -> 0 = No         Social Determinants of Health affecting care:   None, Transportation, and Dementia    Disposition:  The patient was discharged to home.     Impression & Plan    Medical Decision Making:  This is a 89-year-old male who presents to the ER with concerns that his home regarding weakness and head injury.  I spoke with his daughter the main concern was the head injury with him bumping heads with somebody else today.  She feels after visiting him over the weekend that he is not significantly weaker.   Basic labs were obtained patient's not anemic, has a normal white blood cell count, electrolytes within normal limits.  UA negative for infection.  Patient does not have COVID-19 influenza or RSV.  Patient clinical exam seems to be at his baseline without any evidence of stroke, or neurological deficit.  CT imaging of his head obtained showing no signs of skull or brain bleeding.  Patient seems very lucid and knows the year, place and person.  He does not feel ill at this time.  He is without chest pain or abdominal pain.  After discussion with his daughter his daughter feels this is adequate workup and would like him to go back home if CT imaging is negative which it is and he was discharged back to his care center.  Close primary care follow-up with over the next couple days to ensure he is not worsening and return back to the emergency department if he develops new concerning signs or symptoms, vomiting, headache, worsening mental state, or worsening condition.    Diagnosis:    ICD-10-CM    1. Injury of head, initial encounter  S09.90XA            Discharge Medications:  Discharge Medication List as of 12/6/2023  3:18 PM         Scribe Disclosure:  I, Daron Castillo, am serving as a scribe at 12:06 PM on 12/6/2023 to document services personally performed by Rafael Al PA-C based on my observations and the provider's statements to me.   12/6/2023   Rafael Al PA-C Kruger, Jacob C, PA-C  12/06/23 2015

## 2023-12-06 NOTE — ED NOTES
Bed: ED38  Expected date:   Expected time:   Means of arrival:   Comments:  BM5 89M Hit head, altered   Medication requested    Norco 10/325 1 every 8 hrs    Dx:  Knee pain left 9/2/20 scheduled with Dr. Ad Narayan written:  8/12/20 10 day supply #30   Last RX dispensed (per PDMP):  8/12/20     Last office visit:  8/19/20  Upcoming office visit:  10/1/20    Patient due, prepped if agree    Routed to provider      Pharmacy [facility]

## 2023-12-09 NOTE — PROGRESS NOTES
"Jacob Boland is a 89 year old male seen November 17, 2023 at [facility] where he was admitted this month from AL.  Pt is seen in  his room up to WC.  Pleasant and conversational, some wordfinding difficulty and loss of fluency.   Reports he's tired, but otherwise no new symptoms or concerns reported by nursing staff.             By chart review, pt was able to live in his house of many years until he contracted COVID and suffered hallucinations.   Had a fall in April 2023 and was hospitalized at [facility] with delirium   No significant injury from his fall.    Discharged to [facility] TCU /LT, which wasn't a good fit for patient, \"I shouldn't have been there.\"   Transferred to [facility] in July   Pt has had a long h/o cognitive decline with impairment in executive function   [facility]        Past Medical History:   Diagnosis Date    Anxiety     Chronic osteoarthritis     COPD (chronic obstructive pulmonary disease) (H)     Depressive disorder     Gastroesophageal reflux disease    Dementia, late onset     Past Surgical History:   Procedure Laterality Date    JOINT REPLACEMENT, HIP RT/LT Right     LUMBAR LAMINECTOMY  2006     SH:  Lived alone, house in [place]   [facility]  Then at [facility] 7/2023 through 10/2023    Daughter Gregoria is [employer], lives in [place]  Son lives in [facility]   Pt had a body work business for 40 years   Non smoker     ROS:  CPT 4.0   Nocturia 4-5x night   No GI symptoms   WC with assist for transfers  11/17/23 80.5 kg (177 lb 8 oz)   11/02/23 80.3 kg (177 lb)   10/30/23 76.2 kg (168 lb)   07/06/23 76.2 kg (168 lb)       EXAM:  NAD   /76   Pulse 66   Temp 97.9  F (36.6  C)   Resp 18   Ht 1.753 m (5' 9\")   Wt 80.5 kg (177 lb 8 oz)   SpO2 95%   BMI 26.21 kg/m     Neck supple without adenopathy  Lungs with decreased BS, coarse crackles bilaterally   Heart RRR s1s2   Abd soft, NT, no distention or " guarding, +BS  Ext with 1+ ankle edema    Neuro: struggles to articulate his thoughts, naming and wordfinding difficulties   Independent in transfer chair to    Psych: affect okay, pleasant    Labs reviewed: unremarkable CBC and BMP on 11/8     Head CT without contrast, 4/24/2023   Findings: No acute intracranial hemorrhage, mass effect, or abnormal extraaxial fluid collection. The parenchymal volume is probably appropriate for age; the ventricles and sulci appear proportional. No acute appearing loss of the gray-white matter differentiation. Mild to moderate patchy and confluent foci of hypoattenuation in the periventricular-predominant cerebral white matter, most likely leukoaraiosis. Possible old insult about the bilateral anterior basal ganglia.       IMP/PLAN:   (M25.561,  M25.562,  G89.29) Chronic pain of both knees    (M54.2) Cervical pain  (M15.9) Primary osteoarthritis involving multiple joints  Comment: reports pain in his neck and knees today   Plan: acetaminophen 1000 mg tid, lidocaine cream, diclofenac gel    for all destinations     (J44.9) Chronic obstructive pulmonary disease, unspecified COPD type (H)  Comment: by hx  Plan: discontinue albuterol MDI as pt doesn't use it     (N40.1,  R35.1) Benign prostatic hyperplasia with nocturia  Comment: up 4-5x night   Plan: continue tamsulosin 0.4 mg/HS   Would try holding mirabegron and see if symptoms improve      Urology follow up     (F03.B0) Moderate dementia without behavioral disturbance, psychotic disturbance, mood disturbance, or anxiety, unspecified dementia type (H)  Comment: long history, with recent episode of confusion and hallucinations    Plan: LTC support for med admin, meals, activity   Pt awaiting CADI waiver and hopes to return to AL   Daughter is coming from out of town to help with the paperwork.       (G60.9) Idiopathic neuropathy  Comment: by history, limits mobility as well   Plan: duloxetine 30 mg/day      Genoveva Elizabeth MD

## 2024-01-01 ENCOUNTER — LAB REQUISITION (OUTPATIENT)
Dept: LAB | Facility: CLINIC | Age: 89
End: 2024-01-01
Payer: MEDICARE

## 2024-01-01 ENCOUNTER — APPOINTMENT (OUTPATIENT)
Dept: GENERAL RADIOLOGY | Facility: CLINIC | Age: 89
End: 2024-01-01
Attending: EMERGENCY MEDICINE
Payer: MEDICARE

## 2024-01-01 ENCOUNTER — APPOINTMENT (OUTPATIENT)
Dept: CT IMAGING | Facility: CLINIC | Age: 89
End: 2024-01-01
Attending: EMERGENCY MEDICINE
Payer: MEDICARE

## 2024-01-01 ENCOUNTER — NURSING HOME VISIT (OUTPATIENT)
Dept: GERIATRICS | Facility: CLINIC | Age: 89
End: 2024-01-01
Payer: MEDICARE

## 2024-01-01 ENCOUNTER — DOCUMENTATION ONLY (OUTPATIENT)
Dept: OTHER | Facility: CLINIC | Age: 89
End: 2024-01-01
Payer: MEDICARE

## 2024-01-01 ENCOUNTER — DISCHARGE SUMMARY NURSING HOME (OUTPATIENT)
Dept: GERIATRICS | Facility: CLINIC | Age: 89
End: 2024-01-01
Payer: MEDICARE

## 2024-01-01 ENCOUNTER — TELEPHONE (OUTPATIENT)
Dept: GERIATRICS | Facility: CLINIC | Age: 89
End: 2024-01-01
Payer: MEDICARE

## 2024-01-01 ENCOUNTER — APPOINTMENT (OUTPATIENT)
Dept: CT IMAGING | Facility: CLINIC | Age: 89
End: 2024-01-01
Payer: MEDICARE

## 2024-01-01 ENCOUNTER — HOSPITAL ENCOUNTER (EMERGENCY)
Facility: CLINIC | Age: 89
Discharge: HOME | End: 2024-04-09
Attending: EMERGENCY MEDICINE | Admitting: EMERGENCY MEDICINE
Payer: MEDICARE

## 2024-01-01 ENCOUNTER — DOCUMENTATION ONLY (OUTPATIENT)
Dept: GERIATRICS | Facility: CLINIC | Age: 89
End: 2024-01-01
Payer: MEDICARE

## 2024-01-01 ENCOUNTER — HOSPITAL ENCOUNTER (EMERGENCY)
Facility: CLINIC | Age: 89
Discharge: HOME | End: 2024-03-27
Attending: EMERGENCY MEDICINE | Admitting: EMERGENCY MEDICINE
Payer: MEDICARE

## 2024-01-01 ENCOUNTER — MEDICAL CORRESPONDENCE (OUTPATIENT)
Dept: HEALTH INFORMATION MANAGEMENT | Facility: CLINIC | Age: 89
End: 2024-01-01

## 2024-01-01 ENCOUNTER — HOSPITAL ENCOUNTER (EMERGENCY)
Facility: CLINIC | Age: 89
Discharge: NURSING, REHAB OR LONG-TERM CARE | End: 2024-03-13
Attending: STUDENT IN AN ORGANIZED HEALTH CARE EDUCATION/TRAINING PROGRAM | Admitting: STUDENT IN AN ORGANIZED HEALTH CARE EDUCATION/TRAINING PROGRAM
Payer: MEDICARE

## 2024-01-01 VITALS
OXYGEN SATURATION: 96 % | WEIGHT: 183 LBS | TEMPERATURE: 97.6 F | HEIGHT: 69 IN | BODY MASS INDEX: 27.11 KG/M2 | DIASTOLIC BLOOD PRESSURE: 70 MMHG | RESPIRATION RATE: 18 BRPM | SYSTOLIC BLOOD PRESSURE: 119 MMHG | HEART RATE: 75 BPM

## 2024-01-01 VITALS
TEMPERATURE: 97.5 F | HEART RATE: 57 BPM | SYSTOLIC BLOOD PRESSURE: 134 MMHG | RESPIRATION RATE: 16 BRPM | OXYGEN SATURATION: 96 % | WEIGHT: 175.93 LBS | DIASTOLIC BLOOD PRESSURE: 64 MMHG | BODY MASS INDEX: 26.06 KG/M2 | HEIGHT: 69 IN

## 2024-01-01 VITALS
HEART RATE: 78 BPM | WEIGHT: 181 LBS | TEMPERATURE: 97.6 F | SYSTOLIC BLOOD PRESSURE: 89 MMHG | BODY MASS INDEX: 26.81 KG/M2 | HEIGHT: 69 IN | RESPIRATION RATE: 18 BRPM | DIASTOLIC BLOOD PRESSURE: 61 MMHG | OXYGEN SATURATION: 92 %

## 2024-01-01 VITALS
WEIGHT: 180.4 LBS | RESPIRATION RATE: 18 BRPM | TEMPERATURE: 97.8 F | BODY MASS INDEX: 26.72 KG/M2 | HEIGHT: 69 IN | HEART RATE: 74 BPM | DIASTOLIC BLOOD PRESSURE: 72 MMHG | SYSTOLIC BLOOD PRESSURE: 117 MMHG | OXYGEN SATURATION: 95 %

## 2024-01-01 VITALS
HEIGHT: 69 IN | WEIGHT: 181 LBS | RESPIRATION RATE: 17 BRPM | TEMPERATURE: 98.1 F | BODY MASS INDEX: 26.81 KG/M2 | DIASTOLIC BLOOD PRESSURE: 68 MMHG | OXYGEN SATURATION: 94 % | SYSTOLIC BLOOD PRESSURE: 133 MMHG | HEART RATE: 82 BPM

## 2024-01-01 VITALS
DIASTOLIC BLOOD PRESSURE: 61 MMHG | WEIGHT: 181 LBS | HEIGHT: 69 IN | TEMPERATURE: 97.7 F | RESPIRATION RATE: 18 BRPM | BODY MASS INDEX: 26.81 KG/M2 | SYSTOLIC BLOOD PRESSURE: 112 MMHG | HEART RATE: 78 BPM | OXYGEN SATURATION: 97 %

## 2024-01-01 VITALS
HEART RATE: 53 BPM | TEMPERATURE: 97.5 F | RESPIRATION RATE: 24 BRPM | WEIGHT: 181 LBS | OXYGEN SATURATION: 96 % | BODY MASS INDEX: 26.73 KG/M2 | DIASTOLIC BLOOD PRESSURE: 73 MMHG | SYSTOLIC BLOOD PRESSURE: 97 MMHG

## 2024-01-01 VITALS
HEIGHT: 69 IN | WEIGHT: 170.8 LBS | TEMPERATURE: 98 F | DIASTOLIC BLOOD PRESSURE: 70 MMHG | HEART RATE: 65 BPM | RESPIRATION RATE: 18 BRPM | OXYGEN SATURATION: 96 % | BODY MASS INDEX: 25.3 KG/M2 | SYSTOLIC BLOOD PRESSURE: 112 MMHG

## 2024-01-01 VITALS
DIASTOLIC BLOOD PRESSURE: 53 MMHG | RESPIRATION RATE: 18 BRPM | BODY MASS INDEX: 25.18 KG/M2 | WEIGHT: 170 LBS | OXYGEN SATURATION: 92 % | HEART RATE: 56 BPM | SYSTOLIC BLOOD PRESSURE: 111 MMHG | TEMPERATURE: 97.2 F | HEIGHT: 69 IN

## 2024-01-01 VITALS
OXYGEN SATURATION: 99 % | HEIGHT: 69 IN | RESPIRATION RATE: 18 BRPM | BODY MASS INDEX: 26.81 KG/M2 | WEIGHT: 181 LBS | HEART RATE: 84 BPM | SYSTOLIC BLOOD PRESSURE: 112 MMHG | TEMPERATURE: 98.1 F | DIASTOLIC BLOOD PRESSURE: 73 MMHG

## 2024-01-01 VITALS
TEMPERATURE: 97.9 F | HEART RATE: 78 BPM | BODY MASS INDEX: 25.36 KG/M2 | RESPIRATION RATE: 18 BRPM | HEIGHT: 69 IN | WEIGHT: 171.2 LBS | SYSTOLIC BLOOD PRESSURE: 113 MMHG | DIASTOLIC BLOOD PRESSURE: 68 MMHG | OXYGEN SATURATION: 94 %

## 2024-01-01 VITALS
DIASTOLIC BLOOD PRESSURE: 72 MMHG | RESPIRATION RATE: 18 BRPM | WEIGHT: 173 LBS | OXYGEN SATURATION: 95 % | TEMPERATURE: 98.2 F | BODY MASS INDEX: 25.62 KG/M2 | HEIGHT: 69 IN | SYSTOLIC BLOOD PRESSURE: 118 MMHG | HEART RATE: 74 BPM

## 2024-01-01 VITALS
SYSTOLIC BLOOD PRESSURE: 118 MMHG | TEMPERATURE: 97.5 F | HEIGHT: 69 IN | RESPIRATION RATE: 18 BRPM | WEIGHT: 183 LBS | DIASTOLIC BLOOD PRESSURE: 84 MMHG | BODY MASS INDEX: 27.11 KG/M2 | OXYGEN SATURATION: 97 % | HEART RATE: 88 BPM

## 2024-01-01 VITALS
SYSTOLIC BLOOD PRESSURE: 132 MMHG | RESPIRATION RATE: 18 BRPM | OXYGEN SATURATION: 95 % | TEMPERATURE: 98.8 F | DIASTOLIC BLOOD PRESSURE: 56 MMHG | HEART RATE: 68 BPM

## 2024-01-01 DIAGNOSIS — R11.10 VOMITING, UNSPECIFIED: ICD-10-CM

## 2024-01-01 DIAGNOSIS — G60.9 IDIOPATHIC NEUROPATHY: ICD-10-CM

## 2024-01-01 DIAGNOSIS — G89.29 CHRONIC PAIN OF BOTH KNEES: ICD-10-CM

## 2024-01-01 DIAGNOSIS — D64.9 ANEMIA, UNSPECIFIED TYPE: ICD-10-CM

## 2024-01-01 DIAGNOSIS — G47.00 INSOMNIA, UNSPECIFIED TYPE: ICD-10-CM

## 2024-01-01 DIAGNOSIS — K59.01 SLOW TRANSIT CONSTIPATION: ICD-10-CM

## 2024-01-01 DIAGNOSIS — M11.20 PSEUDOGOUT: ICD-10-CM

## 2024-01-01 DIAGNOSIS — J44.9 CHRONIC OBSTRUCTIVE PULMONARY DISEASE, UNSPECIFIED COPD TYPE (H): Primary | ICD-10-CM

## 2024-01-01 DIAGNOSIS — F03.B4 MODERATE DEMENTIA WITH ANXIETY, UNSPECIFIED DEMENTIA TYPE (H): ICD-10-CM

## 2024-01-01 DIAGNOSIS — N40.1 BENIGN PROSTATIC HYPERPLASIA WITH NOCTURIA: ICD-10-CM

## 2024-01-01 DIAGNOSIS — U07.1 COVID-19: ICD-10-CM

## 2024-01-01 DIAGNOSIS — S80.812A ABRASION OF ANTERIOR LEFT LOWER LEG, INITIAL ENCOUNTER: Primary | ICD-10-CM
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
## 2024-01-01 DIAGNOSIS — F03.B0 MODERATE DEMENTIA WITHOUT BEHAVIORAL DISTURBANCE, PSYCHOTIC DISTURBANCE, MOOD DISTURBANCE, OR ANXIETY, UNSPECIFIED DEMENTIA TYPE (H): ICD-10-CM

## 2024-01-01 DIAGNOSIS — M15.0 PRIMARY OSTEOARTHRITIS INVOLVING MULTIPLE JOINTS: Primary | ICD-10-CM
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
## 2024-01-01 DIAGNOSIS — M25.561 CHRONIC PAIN OF BOTH KNEES: ICD-10-CM

## 2024-01-01 DIAGNOSIS — K21.9 GASTROESOPHAGEAL REFLUX DISEASE, UNSPECIFIED WHETHER ESOPHAGITIS PRESENT: ICD-10-CM

## 2024-01-01 DIAGNOSIS — R35.1 BENIGN PROSTATIC HYPERPLASIA WITH NOCTURIA: ICD-10-CM

## 2024-01-01 DIAGNOSIS — F41.9 ANXIETY AND DEPRESSION: ICD-10-CM

## 2024-01-01 DIAGNOSIS — F33.9 RECURRENT MAJOR DEPRESSIVE DISORDER, REMISSION STATUS UNSPECIFIED (H): ICD-10-CM

## 2024-01-01 DIAGNOSIS — R41.0 CONFUSION: Primary | ICD-10-CM

## 2024-01-01 DIAGNOSIS — M25.432 PAIN AND SWELLING OF LEFT WRIST: Primary | ICD-10-CM

## 2024-01-01 DIAGNOSIS — Z51.5 HOSPICE CARE PATIENT: Primary | ICD-10-CM

## 2024-01-01 DIAGNOSIS — Z51.5 HOSPICE CARE PATIENT: ICD-10-CM

## 2024-01-01 DIAGNOSIS — R41.0 CONFUSION: ICD-10-CM

## 2024-01-01 DIAGNOSIS — S71.102A OPEN WOUND OF LEFT THIGH, INITIAL ENCOUNTER: ICD-10-CM

## 2024-01-01 DIAGNOSIS — A08.19 ACUTE GASTROENTEROPATHY DUE TO OTHER SMALL ROUND VIRUSES: ICD-10-CM

## 2024-01-01 DIAGNOSIS — R11.2 NAUSEA WITH VOMITING, UNSPECIFIED: ICD-10-CM

## 2024-01-01 DIAGNOSIS — J44.9 CHRONIC OBSTRUCTIVE PULMONARY DISEASE, UNSPECIFIED COPD TYPE (H): ICD-10-CM

## 2024-01-01 DIAGNOSIS — M25.562 CHRONIC PAIN OF BOTH KNEES: ICD-10-CM

## 2024-01-01 DIAGNOSIS — F33.9 RECURRENT MAJOR DEPRESSIVE DISORDER, REMISSION STATUS UNSPECIFIED (H): Primary | ICD-10-CM

## 2024-01-01 DIAGNOSIS — R19.7 DIARRHEA, UNSPECIFIED: ICD-10-CM

## 2024-01-01 DIAGNOSIS — D72.829 LEUKOCYTOSIS, UNSPECIFIED TYPE: ICD-10-CM

## 2024-01-01 DIAGNOSIS — K52.9 GASTROENTERITIS: Primary | ICD-10-CM

## 2024-01-01 DIAGNOSIS — R29.6 FALLS FREQUENTLY: ICD-10-CM

## 2024-01-01 DIAGNOSIS — M15.0 PRIMARY OSTEOARTHRITIS INVOLVING MULTIPLE JOINTS: ICD-10-CM

## 2024-01-01 DIAGNOSIS — L03.116 CELLULITIS OF LEFT LOWER EXTREMITY: Primary | ICD-10-CM

## 2024-01-01 DIAGNOSIS — A08.11 NOROVIRUS: Primary | ICD-10-CM

## 2024-01-01 DIAGNOSIS — R51.9 ACUTE NONINTRACTABLE HEADACHE, UNSPECIFIED HEADACHE TYPE: ICD-10-CM

## 2024-01-01 DIAGNOSIS — G30.9 ALZHEIMER'S DISEASE (H): ICD-10-CM

## 2024-01-01 DIAGNOSIS — Z13.6 SCREENING FOR HYPERTENSION: ICD-10-CM

## 2024-01-01 DIAGNOSIS — N40.0 BENIGN PROSTATIC HYPERPLASIA, UNSPECIFIED WHETHER LOWER URINARY TRACT SYMPTOMS PRESENT: ICD-10-CM

## 2024-01-01 DIAGNOSIS — R05.1 ACUTE COUGH: ICD-10-CM

## 2024-01-01 DIAGNOSIS — S80.812A ABRASION OF ANTERIOR LEFT LOWER LEG, INITIAL ENCOUNTER: ICD-10-CM

## 2024-01-01 DIAGNOSIS — E86.1 HYPOTENSION DUE TO HYPOVOLEMIA: ICD-10-CM

## 2024-01-01 DIAGNOSIS — F03.B4 MODERATE DEMENTIA WITH ANXIETY, UNSPECIFIED DEMENTIA TYPE (H): Primary | ICD-10-CM

## 2024-01-01 DIAGNOSIS — Z23 NEED FOR TDAP VACCINATION: ICD-10-CM

## 2024-01-01 DIAGNOSIS — F02.80 ALZHEIMER'S DISEASE (H): ICD-10-CM

## 2024-01-01 DIAGNOSIS — R29.6 UNWITNESSED FALL: ICD-10-CM

## 2024-01-01 DIAGNOSIS — M25.532 PAIN AND SWELLING OF LEFT WRIST: Primary | ICD-10-CM

## 2024-01-01 DIAGNOSIS — F32.A ANXIETY AND DEPRESSION: ICD-10-CM

## 2024-01-01 DIAGNOSIS — Z79.899 POLYPHARMACY: ICD-10-CM

## 2024-01-01 LAB
ADV 40+41 DNA STL QL NAA+NON-PROBE: NEGATIVE
ANION GAP SERPL CALCULATED.3IONS-SCNC: 12 MMOL/L (ref 7–15)
ANION GAP SERPL CALCULATED.3IONS-SCNC: 12 MMOL/L (ref 7–15)
ANION GAP SERPL CALCULATED.3IONS-SCNC: 8 MMOL/L (ref 7–15)
ASTRO TYP 1-8 RNA STL QL NAA+NON-PROBE: NEGATIVE
ATRIAL RATE - MUSE: 52 BPM
BASOPHILS # BLD AUTO: 0.1 10E3/UL (ref 0–0.2)
BASOPHILS NFR BLD AUTO: 1 %
BUN SERPL-MCNC: 17.5 MG/DL (ref 8–23)
BUN SERPL-MCNC: 19.8 MG/DL (ref 8–23)
BUN SERPL-MCNC: 21.7 MG/DL (ref 8–23)
C CAYETANENSIS DNA STL QL NAA+NON-PROBE: NEGATIVE
CALCIUM SERPL-MCNC: 8.3 MG/DL (ref 8.2–9.6)
CALCIUM SERPL-MCNC: 8.6 MG/DL (ref 8.2–9.6)
CALCIUM SERPL-MCNC: 8.7 MG/DL (ref 8.2–9.6)
CAMPYLOBACTER DNA SPEC NAA+PROBE: NEGATIVE
CHLORIDE SERPL-SCNC: 100 MMOL/L (ref 98–107)
CHLORIDE SERPL-SCNC: 103 MMOL/L (ref 98–107)
CHLORIDE SERPL-SCNC: 98 MMOL/L (ref 98–107)
CREAT SERPL-MCNC: 0.67 MG/DL (ref 0.67–1.17)
CREAT SERPL-MCNC: 0.68 MG/DL (ref 0.67–1.17)
CREAT SERPL-MCNC: 0.87 MG/DL (ref 0.67–1.17)
CRYPTOSP DNA STL QL NAA+NON-PROBE: NEGATIVE
DEPRECATED HCO3 PLAS-SCNC: 24 MMOL/L (ref 22–29)
DEPRECATED HCO3 PLAS-SCNC: 24 MMOL/L (ref 22–29)
DEPRECATED HCO3 PLAS-SCNC: 29 MMOL/L (ref 22–29)
DIASTOLIC BLOOD PRESSURE - MUSE: NORMAL MMHG
E COLI O157 DNA STL QL NAA+NON-PROBE: ABNORMAL
E HISTOLYT DNA STL QL NAA+NON-PROBE: NEGATIVE
EAEC ASTA GENE ISLT QL NAA+PROBE: NEGATIVE
EC STX1+STX2 GENES STL QL NAA+NON-PROBE: NEGATIVE
EGFRCR SERPLBLD CKD-EPI 2021: 81 ML/MIN/1.73M2
EGFRCR SERPLBLD CKD-EPI 2021: 88 ML/MIN/1.73M2
EGFRCR SERPLBLD CKD-EPI 2021: 88 ML/MIN/1.73M2
EOSINOPHIL # BLD AUTO: 0.4 10E3/UL (ref 0–0.7)
EOSINOPHIL NFR BLD AUTO: 5 %
EPEC EAE GENE STL QL NAA+NON-PROBE: NEGATIVE
ERYTHROCYTE [DISTWIDTH] IN BLOOD BY AUTOMATED COUNT: 14.1 % (ref 10–15)
ERYTHROCYTE [DISTWIDTH] IN BLOOD BY AUTOMATED COUNT: 14.3 % (ref 10–15)
ERYTHROCYTE [DISTWIDTH] IN BLOOD BY AUTOMATED COUNT: 14.6 % (ref 10–15)
ETEC LTA+ST1A+ST1B TOX ST NAA+NON-PROBE: NEGATIVE
FLUAV RNA SPEC QL NAA+PROBE: NEGATIVE
FLUBV RNA RESP QL NAA+PROBE: NEGATIVE
G LAMBLIA DNA STL QL NAA+NON-PROBE: NEGATIVE
GLUCOSE SERPL-MCNC: 84 MG/DL (ref 70–99)
GLUCOSE SERPL-MCNC: 84 MG/DL (ref 70–99)
GLUCOSE SERPL-MCNC: 93 MG/DL (ref 70–99)
HCT VFR BLD AUTO: 35.1 % (ref 40–53)
HCT VFR BLD AUTO: 36 % (ref 40–53)
HCT VFR BLD AUTO: 37.9 % (ref 40–53)
HGB BLD-MCNC: 11.4 G/DL (ref 13.3–17.7)
HGB BLD-MCNC: 11.4 G/DL (ref 13.3–17.7)
HGB BLD-MCNC: 11.9 G/DL (ref 13.3–17.7)
IMM GRANULOCYTES # BLD: 0 10E3/UL
IMM GRANULOCYTES NFR BLD: 0 %
INTERPRETATION ECG - MUSE: NORMAL
LYMPHOCYTES # BLD AUTO: 2.8 10E3/UL (ref 0.8–5.3)
LYMPHOCYTES NFR BLD AUTO: 36 %
MCH RBC QN AUTO: 28.5 PG (ref 26.5–33)
MCH RBC QN AUTO: 28.7 PG (ref 26.5–33)
MCH RBC QN AUTO: 29.2 PG (ref 26.5–33)
MCHC RBC AUTO-ENTMCNC: 31.4 G/DL (ref 31.5–36.5)
MCHC RBC AUTO-ENTMCNC: 31.7 G/DL (ref 31.5–36.5)
MCHC RBC AUTO-ENTMCNC: 32.5 G/DL (ref 31.5–36.5)
MCV RBC AUTO: 88 FL (ref 78–100)
MCV RBC AUTO: 91 FL (ref 78–100)
MCV RBC AUTO: 92 FL (ref 78–100)
MONOCYTES # BLD AUTO: 0.8 10E3/UL (ref 0–1.3)
MONOCYTES NFR BLD AUTO: 11 %
NEUTROPHILS # BLD AUTO: 3.6 10E3/UL (ref 1.6–8.3)
NEUTROPHILS NFR BLD AUTO: 47 %
NOROVIRUS GI+II RNA STL QL NAA+NON-PROBE: POSITIVE
NRBC # BLD AUTO: 0 10E3/UL
NRBC BLD AUTO-RTO: 0 /100
P AXIS - MUSE: 54 DEGREES
P SHIGELLOIDES DNA STL QL NAA+NON-PROBE: NEGATIVE
PLATELET # BLD AUTO: 174 10E3/UL (ref 150–450)
PLATELET # BLD AUTO: 200 10E3/UL (ref 150–450)
PLATELET # BLD AUTO: 203 10E3/UL (ref 150–450)
POTASSIUM SERPL-SCNC: 3.9 MMOL/L (ref 3.4–5.3)
POTASSIUM SERPL-SCNC: 4.1 MMOL/L (ref 3.4–5.3)
POTASSIUM SERPL-SCNC: 4.2 MMOL/L (ref 3.4–5.3)
PR INTERVAL - MUSE: 162 MS
QRS DURATION - MUSE: 90 MS
QT - MUSE: 442 MS
QTC - MUSE: 411 MS
R AXIS - MUSE: 43 DEGREES
RBC # BLD AUTO: 3.9 10E6/UL (ref 4.4–5.9)
RBC # BLD AUTO: 3.97 10E6/UL (ref 4.4–5.9)
RBC # BLD AUTO: 4.18 10E6/UL (ref 4.4–5.9)
RSV RNA SPEC NAA+PROBE: NEGATIVE
RVA RNA STL QL NAA+NON-PROBE: NEGATIVE
SALMONELLA SP RPOD STL QL NAA+PROBE: NEGATIVE
SAPO I+II+IV+V RNA STL QL NAA+NON-PROBE: NEGATIVE
SARS-COV-2 RNA RESP QL NAA+PROBE: NEGATIVE
SHIGELLA SP+EIEC IPAH ST NAA+NON-PROBE: NEGATIVE
SODIUM SERPL-SCNC: 135 MMOL/L (ref 135–145)
SODIUM SERPL-SCNC: 136 MMOL/L (ref 135–145)
SODIUM SERPL-SCNC: 139 MMOL/L (ref 135–145)
SYSTOLIC BLOOD PRESSURE - MUSE: NORMAL MMHG
T AXIS - MUSE: 41 DEGREES
V CHOLERAE DNA SPEC QL NAA+PROBE: NEGATIVE
VENTRICULAR RATE- MUSE: 52 BPM
VIBRIO DNA SPEC NAA+PROBE: NEGATIVE
WBC # BLD AUTO: 7.6 10E3/UL (ref 4–11)
WBC # BLD AUTO: 7.7 10E3/UL (ref 4–11)
WBC # BLD AUTO: 8.5 10E3/UL (ref 4–11)
Y ENTEROCOL DNA STL QL NAA+PROBE: NEGATIVE

## 2024-01-01 PROCEDURE — 87635 SARS-COV-2 COVID-19 AMP PRB: CPT | Mod: ORL

## 2024-01-01 PROCEDURE — 99284 EMERGENCY DEPT VISIT MOD MDM: CPT | Mod: 25

## 2024-01-01 PROCEDURE — 99309 SBSQ NF CARE MODERATE MDM 30: CPT

## 2024-01-01 PROCEDURE — 85027 COMPLETE CBC AUTOMATED: CPT | Mod: ORL

## 2024-01-01 PROCEDURE — 93005 ELECTROCARDIOGRAM TRACING: CPT

## 2024-01-01 PROCEDURE — 99308 SBSQ NF CARE LOW MDM 20: CPT | Mod: GW

## 2024-01-01 PROCEDURE — 73552 X-RAY EXAM OF FEMUR 2/>: CPT | Mod: LT

## 2024-01-01 PROCEDURE — 80048 BASIC METABOLIC PNL TOTAL CA: CPT

## 2024-01-01 PROCEDURE — 70450 CT HEAD/BRAIN W/O DYE: CPT | Mod: MA

## 2024-01-01 PROCEDURE — 36415 COLL VENOUS BLD VENIPUNCTURE: CPT | Mod: ORL

## 2024-01-01 PROCEDURE — 72170 X-RAY EXAM OF PELVIS: CPT

## 2024-01-01 PROCEDURE — 70450 CT HEAD/BRAIN W/O DYE: CPT | Mod: MG

## 2024-01-01 PROCEDURE — 70450 CT HEAD/BRAIN W/O DYE: CPT | Mod: ME

## 2024-01-01 PROCEDURE — 85025 COMPLETE CBC W/AUTO DIFF WBC: CPT

## 2024-01-01 PROCEDURE — 80048 BASIC METABOLIC PNL TOTAL CA: CPT | Mod: ORL

## 2024-01-01 PROCEDURE — P9603 ONE-WAY ALLOW PRORATED MILES: HCPCS | Mod: ORL

## 2024-01-01 PROCEDURE — 73590 X-RAY EXAM OF LOWER LEG: CPT | Mod: LT

## 2024-01-01 PROCEDURE — 99309 SBSQ NF CARE MODERATE MDM 30: CPT | Performed by: INTERNAL MEDICINE

## 2024-01-01 PROCEDURE — 250N000013 HC RX MED GY IP 250 OP 250 PS 637: Performed by: EMERGENCY MEDICINE

## 2024-01-01 PROCEDURE — G1010 CDSM STANSON: HCPCS

## 2024-01-01 PROCEDURE — 99309 SBSQ NF CARE MODERATE MDM 30: CPT | Mod: GW

## 2024-01-01 PROCEDURE — 36415 COLL VENOUS BLD VENIPUNCTURE: CPT

## 2024-01-01 PROCEDURE — 73562 X-RAY EXAM OF KNEE 3: CPT | Mod: LT

## 2024-01-01 PROCEDURE — 87507 IADNA-DNA/RNA PROBE TQ 12-25: CPT | Performed by: INTERNAL MEDICINE

## 2024-01-01 PROCEDURE — 87635 SARS-COV-2 COVID-19 AMP PRB: CPT

## 2024-01-01 PROCEDURE — 99316 NF DSCHRG MGMT 30 MIN+: CPT

## 2024-01-01 PROCEDURE — 87637 SARSCOV2&INF A&B&RSV AMP PRB: CPT

## 2024-01-01 PROCEDURE — 72125 CT NECK SPINE W/O DYE: CPT | Mod: MA

## 2024-01-01 RX ORDER — TROLAMINE SALICYLATE 10 G/100G
CREAM TOPICAL PRN
Qty: 90 G | Refills: 11 | Status: SHIPPED | OUTPATIENT
Start: 2024-01-01

## 2024-01-01 RX ORDER — CEPHALEXIN 500 MG/1
500 CAPSULE ORAL 3 TIMES DAILY
COMMUNITY
End: 2024-01-01

## 2024-01-01 RX ORDER — HYOSCYAMINE SULFATE 0.125 MG
0.12 TABLET ORAL EVERY 4 HOURS PRN
COMMUNITY

## 2024-01-01 RX ORDER — CITALOPRAM HYDROBROMIDE 10 MG/1
20 TABLET ORAL DAILY
COMMUNITY

## 2024-01-01 RX ORDER — ACETAMINOPHEN 500 MG
1000 TABLET ORAL ONCE
Status: COMPLETED | OUTPATIENT
Start: 2024-01-01 | End: 2024-01-01

## 2024-01-01 RX ORDER — MORPHINE SULFATE 20 MG/ML
2.5 SOLUTION ORAL
COMMUNITY

## 2024-01-01 RX ORDER — ONDANSETRON 4 MG/1
4 TABLET, FILM COATED ORAL EVERY 6 HOURS PRN
COMMUNITY

## 2024-01-01 RX ORDER — LORAZEPAM 2 MG/ML
0.5 CONCENTRATE ORAL EVERY 4 HOURS PRN
COMMUNITY

## 2024-01-01 RX ORDER — BISACODYL 10 MG
10 SUPPOSITORY, RECTAL RECTAL DAILY PRN
COMMUNITY

## 2024-01-01 RX ORDER — ACETAMINOPHEN 500 MG
1000 TABLET ORAL DAILY PRN
COMMUNITY

## 2024-01-01 RX ORDER — MIRTAZAPINE 7.5 MG/1
7.5 TABLET, FILM COATED ORAL AT BEDTIME
COMMUNITY

## 2024-01-01 RX ADMIN — ACETAMINOPHEN 1000 MG: 500 TABLET, FILM COATED ORAL at 07:33

## 2024-01-01 ASSESSMENT — COLUMBIA-SUICIDE SEVERITY RATING SCALE - C-SSRS
6. HAVE YOU EVER DONE ANYTHING, STARTED TO DO ANYTHING, OR PREPARED TO DO ANYTHING TO END YOUR LIFE?: NO
5. HAVE YOU STARTED TO WORK OUT OR WORKED OUT THE DETAILS OF HOW TO KILL YOURSELF? DO YOU INTEND TO CARRY OUT THIS PLAN?: NO
IS THE PATIENT NOT ABLE TO COMPLETE C-SSRS: UNABLE TO VERBALIZE
3. HAVE YOU BEEN THINKING ABOUT HOW YOU MIGHT KILL YOURSELF?: NO
1. IN THE PAST MONTH, HAVE YOU WISHED YOU WERE DEAD OR WISHED YOU COULD GO TO SLEEP AND NOT WAKE UP?: YES
4. HAVE YOU HAD THESE THOUGHTS AND HAD SOME INTENTION OF ACTING ON THEM?: NO
IS THE PATIENT NOT ABLE TO COMPLETE C-SSRS: UNABLE TO VERBALIZE
2. HAVE YOU ACTUALLY HAD ANY THOUGHTS OF KILLING YOURSELF IN THE PAST MONTH?: YES

## 2024-01-01 ASSESSMENT — ACTIVITIES OF DAILY LIVING (ADL)
ADLS_ACUITY_SCORE: 35

## 2024-01-12 NOTE — PROGRESS NOTES
"Jacob Boland is a 89 year old male seen January 12, 2024 at [facility] where he has resided for 3 months (admit 11/2024) seen for regulatory visit and to follow up dementia   Pt is seen on the unit up to WC, reports feeling okay.   Denies any current pain or other symptoms.    Pt transfers with assistance, then able to get about independently in his WC.     He was seen in the [facility] ED in December following a head injury with subsequent nondescript symptoms the following day, seeming \"off.\"   Workup unremarkable and he returned to LT.            By chart review, pt was able to live in his house of many years until he contracted COVID and suffered hallucinations.   Had a fall in April 2023 and was hospitalized [place] [facility] [place]ium   No significant injury from his fall.    Discharged to [facility] TCU /LTC, which wasn't a good fit for patient, then transferred to [facility] in July 2023, moved to LTC in November 2023    Pt has had a long h/o cognitive decline with impairment in executive function   [facility]        Past Medical History:   Diagnosis Date    Anxiety     Chronic osteoarthritis     COPD (chronic obstructive pulmonary disease) (H)     Depressive disorder     Gastroesophageal reflux disease    Dementia, late onset     Past Surgical History:   Procedure Laterality Date    JOINT REPLACEMENT, HIP RT/LT Right     LUMBAR LAMINECTOMY  2006     SH:  Lived alone, house in [place]   [facility]  Then at [facility] 7/2023 through 10/2023    Daughter Gregoria is [employer], lives in [place]  Son lives in [facility]   Pt had an auto body work business for 40 years   Non smoker     ROS:  CPT 4.0   Nocturia 4-5x night   No GI symptoms   WC bound with assist for transfers  Weight 168 in August 2023  Wt Readings from Last 5 Encounters:   01/12/24 81.8 kg (180 lb 6.4 oz)   12/05/23 81.2 kg (179 lb)   11/17/23 80.5 kg (177 lb 8 oz)   11/02/23 " "80.3 kg (177 lb)   10/30/23 76.2 kg (168 lb)      EXAM:  NAD   /72   Pulse 74   Temp 97.8  F (36.6  C)   Resp 18   Ht 1.753 m (5' 9\")   Wt 81.8 kg (180 lb 6.4 oz)   SpO2 95%   BMI 26.64 kg/m     Neck supple without adenopathy  Lungs with decreased BS  Heart RRR s1s2   Abd soft, NT, no distention or guarding, +BS  Ext with 1+ ankle edema    Neuro: struggles to articulate his thoughts, naming and wordfinding difficulties   Psych: affect okay, pleasant    Lab Results   Component Value Date     12/06/2023    POTASSIUM 4.9 12/06/2023    CHLORIDE 101 12/06/2023    CO2 30 (H) 12/06/2023    ANIONGAP 7 12/06/2023    GLC 96 12/06/2023    BUN 16.2 12/06/2023    CR 0.81 12/06/2023    GFRESTIMATED 84 12/06/2023    SCOTT 8.5 12/06/2023     Lab Results   Component Value Date    WBC 7.1 12/06/2023      HGB 12.0 12/06/2023      MCV 92 12/06/2023       12/06/2023     CT SCAN OF THE HEAD WITHOUT CONTRAST   12/6/2023   FINDINGS: There is no evidence of intracranial hemorrhage, mass, acute  infarct or anomaly. The ventricles are normal in size, shape and  configuration. Moderate diffuse parenchymal volume loss. Moderate  patchy periventricular white matter hypodensities which are  nonspecific, but likely related to chronic microvascular ischemic disease.                                                                 IMPRESSION:     1. No evidence of acute intracranial hemorrhage, mass, or herniation.  2. Moderate diffuse parenchymal volume loss and white matter changes  likely due to chronic microvascular ischemic disease.      IMP/PLAN:   (M25.561,  M25.562,  G89.29) Chronic pain of both knees    (M15.9) Primary osteoarthritis involving multiple joints  Comment: intermittent pain    Plan: acetaminophen 1000 mg tid, lidocaine patch, diclofenac gel   WC for all destinations     (J44.9) Chronic obstructive pulmonary disease, unspecified COPD type (H)  Comment: by hx  Plan: PRN albuterol inhaler    (N40.1,  R35.1) " Benign prostatic hyperplasia with nocturia  Comment: up 4-5x night   Plan: continue tamsulosin 0.4 mg/HS   Would try holding mirabegron and see if symptoms improve      Urology follow up     (F03.B0) Moderate dementia without behavioral disturbance, psychotic disturbance, mood disturbance, or anxiety, unspecified dementia type (H)  Comment: long history, with past episode of confusion and hallucinations    Plan: LTC support for med admin, meals, activity   Goal is transfer to AL, awaiting CADI waiver        (G60.9) Idiopathic neuropathy  Comment: by history, limits mobility as well   Plan: duloxetine 30 mg/day      Genoveva Elizabeth MD

## 2024-01-12 NOTE — LETTER
1/12/2024        RE: Jacob Boland  [id number] [facility] [address] [id number]        No notes on file      Sincerely,        Genoveva Elizabeth MD

## 2024-02-15 NOTE — PROGRESS NOTES
[facility] GERIATRICS DISCHARGE SUMMARY  PATIENT'S NAME: Jacob Boland  YOB: 1933  MEDICAL RECORD NUMBER:  [id number]  Place of Service where encounter took place:  [facility]  () [[id number]]    PRIMARY CARE PROVIDER AND CLINIC RESPONSIBLE AFTER TRANSFER:   SHERI Wong CNP, [address] / [place] [address]    Nursing Home: [facility]      Transferring providers: SHERI Wong CNP, Dr. Genoveva Elizabeth MD  Recent Hospitalization/ED:  Emergency Room  [facility] stay 12/6 to 12/6.  Date of SNF Admission:  10/31/23  Date of SNF (anticipated) Discharge:  2/19/24  Discharged to: new skilled nursing facility [facility]  Cognitive Scores: BIMS: 9/15  Physical Function: Wheelchair dependent  DME: No new DME needed    CODE STATUS/ADVANCE DIRECTIVES DISCUSSION:  Prior   ALLERGIES: Atorvastatin, Naproxen, Pravastatin, and Simvastatin    NURSING FACILITY COURSE   By chart review, most recent [facility] 4/24 - 4/26/[facility] [place]. Found down by EMS confused and agitated, completed rehab in TCU but was unable to return to previous living conditions so admitted to AL in July 2023. He has been fairly stable since, now admitting to long-term care for financial reasons. Awaiting CADI waiver to return to AL setting. Several falls in LTC without injury. Seen in ED 12/6 after headstrike, workup including CT head negative for acute findings.     Summary of nursing facility stay:      (J44.9) Chronic obstructive pulmonary disease, unspecified COPD type (H)  (primary encounter diagnosis)  Comment: Chronic, without acute exacerbation  Plan: Continue albuterol as needed, monitor respiratory status     (F03.B0) Moderate dementia without behavioral disturbance, psychotic disturbance, mood disturbance, or anxiety, unspecified dementia type (H)  Comment: Chronic, STML and limited insight.  CPT 4.0/5.6 and June  2023.  Daughter assists with decision-making  Plan: SNF for assist with ADLs, medication management, meals, activities as needed.   following for discharge planning.  PT/OT to screen     (N40.1,  R35.1) Benign prostatic hyperplasia with nocturia  Comment: Chronic, longstanding.  Denied symptoms  Plan: Continue Flomax, mirabegron, follow-up with urology per recommendations.  Recheck BMP     (M15.9) Primary osteoarthritis involving multiple joints  (R29.6) Falls frequently  (primary encounter diagnosis)  Comment: Chronic, with history of neck pain.  Wears lidocaine patch.  Plan:  Start Tylenol 650 mg 3 times daily and once daily as needed for pain. Falll precuations. Continue lidocaine, diclofenac, [facility].  Vitamin D for bone health     (F33.9) Recurrent major depressive disorder, remission status unspecified (H24)  (F43.22) Adjustment disorder with anxious mood  Comment: Chronic depression, mood waxes and wanes, anxious  Plan: Continue Cymbalta, melatonin for sleep, monitor mood, ACP as needed     (G60.9) Idiopathic neuropathy  Comment: Chronic, with history of related falls  Plan: Wheelchair for mobility, staff assist as needed     (D64.9) Anemia, unspecified type  Comment: Chronic, mild with B12 deficiency.  Hemoglobin baseline 11  Hemoglobin   Date Value Ref Range Status   12/06/2023 12.0 (L) 13.3 - 17.7 g/dL Final   11/08/2023 11.9 (L) 13.3 - 17.7 g/dL Final   Plan: Continue oral B12, monitor hemoglobin periodically     (K21.9) Gastroesophageal reflux disease, unspecified whether esophagitis present  Comment: Chronic, ongoing.  Will decrease PPI pharmacy recommendation  Plan: Continue PPI every other day, Tums 3 times daily as needed     (K59.01) Slow transit constipation  Comment: Chronic, ongoing  Plan: Continue senna twice daily as needed, MiraLAX every other day, monitor bowel habits per nursing    Discharge Medications:  MED REC REQUIRED  Post  Medication Reconciliation Status: discharge medications reconciled and changed, per note/orders       Current Outpatient Medications   Medication Sig Dispense Refill    acetaminophen (TYLENOL) 500 MG tablet Take 2 tablets (1,000 mg) by mouth 3 times daily 540 tablet 3    albuterol (PROAIR HFA/PROVENTIL HFA/VENTOLIN HFA) 108 (90 Base) MCG/ACT inhaler Inhale 1-2 puffs into the lungs every 4 hours as needed      calcium carbonate (TUMS) 500 MG chewable tablet Take 1 chew tab by mouth 3 times daily as needed for heartburn      diclofenac (VOLTAREN) 1 % topical gel APPLY 4 GRAMS TOPICALLY TO BOTH KNEES TWICE DAILY 200 g 11    DULoxetine (CYMBALTA) 30 MG capsule Take 1 capsule (30 mg) by mouth daily 90 capsule 3    Lidocaine (LIDOCARE) 4 % Patch Place 1 patch onto the skin every 24 hours (apply to neck---on 12 hours and off 12 hours) 30 patch 11    melatonin 3 MG tablet Take 1 tablet (3 mg) by mouth At Bedtime 90 tablet 3    mirabegron (MYRBETRIQ) 25 MG 24 hr tablet Take 1 tablet (25 mg) by mouth daily 90 tablet 3    Multiple Vitamin (MULTIVITAMIN ADULT) TABS Take 1 tablet by mouth daily 90 tablet 3    omeprazole (PRILOSEC) 20 MG DR capsule Take 20 mg by mouth every other day      polyethylene glycol 3350 POWD Take 17 g by mouth every 48 hours      polyvinyl alcohol (LIQUIFILM TEARS) 1.4 % ophthalmic solution Place 1 drop into both eyes every 4 hours as needed for dry eyes      senna (SENOKOT) 8.6 MG tablet Take 8.6 mg by mouth 2 times daily as needed      tamsulosin (FLOMAX) 0.4 MG capsule Take 1 capsule (0.4 mg) by mouth daily 90 capsule 3    vitamin B-12 (CYANOCOBALAMIN) 1000 MCG tablet Take 1 tablet (1,000 mcg) by mouth daily 90 tablet 3    Vitamin D3 (CHOLECALCIFEROL) 25 mcg (1000 units) tablet Take 1 tablet (25 mcg) by mouth daily 90 tablet 3         Past Medical History:   Past Medical History:   Diagnosis Date    Anxiety     Chronic osteoarthritis     COPD (chronic obstructive pulmonary disease) (H)     Depressive  disorder     Gastroesophageal reflux disease      Physical Exam:   Vitals: There were no vitals taken for this visit.  BMI: There is no height or weight on file to calculate BMI.  GENERAL APPEARANCE:  Alert, in no distress  RESP:  respiratory effort and palpation of chest normal, lungs clear to auscultation , no respiratory distress  CV:  Palpation and auscultation of heart done , regular rate and rhythm, no murmur, rub, or gallop, no edema  ABDOMEN:  normal bowel sounds, soft, nontender, no hepatosplenomegaly or other masses  M/S:   Gait and station abnormal transfers with assist, wheelchair for mobility  SKIN:  Inspection of skin and subcutaneous tissue baseline, Palpation of skin and subcutaneous tissue baseline  NEURO:   deal freely  PSYCH:  memory impaired , repetitive, limited history, oriented to person, place      SNF labs: Labs done in SNF are in [facility]. Please refer to them using [facility] Everywhere. and Recent labs in [facility] reviewed by me today.     DISCHARGE PLAN:  Follow up labs: No labs orders/due  Medical Follow Up:      Follow up with primary care provider in 1-2 weeks  Appointment: Tuesday, May 14 at 2:20 visual field and glaucoma check; Dr. Abdi [address] [phone]  Discharge Services: No therapy or home care recommended.   Discharge Instructions:   Continue to follow your diet:  regular.   Notify your PCP if you have a fever greater than 100.5 degrees.   24-hour supervision is recommended for safety.   OK to admit to secured unit    TOTAL DISCHARGE TIME:   Greater than 30 minutes  Electronically signed by:  SHERI Wong CNP     Home care Face to Face documentation done in [facility] attached to Home care orders for [facility].

## 2024-02-15 NOTE — PATIENT INSTRUCTIONS
Jacob Boland  YOB: 1933                                 SSN: [id number]  [facility] MRN#:[id number]  Medical Center Admission Date: 10/31/23 Discharge Date: 2/19/2024   PHYSICIAN's DISCHARGE SUMMARY / ORDER SHEET  1. Discharge to: [facility]  2. Medications:      Current Outpatient Medications   Medication Sig Dispense Refill    acetaminophen (TYLENOL) 500 MG tablet Take 2 tablets (1,000 mg) by mouth 3 times daily 540 tablet 3    albuterol (PROAIR HFA/PROVENTIL HFA/VENTOLIN HFA) 108 (90 Base) MCG/ACT inhaler Inhale 1-2 puffs into the lungs every 4 hours as needed      calcium carbonate (TUMS) 500 MG chewable tablet Take 1 chew tab by mouth 3 times daily as needed for heartburn      diclofenac (VOLTAREN) 1 % topical gel APPLY 4 GRAMS TOPICALLY TO BOTH KNEES TWICE DAILY 200 g 11    DULoxetine (CYMBALTA) 30 MG capsule Take 1 capsule (30 mg) by mouth daily 90 capsule 3    Lidocaine (LIDOCARE) 4 % Patch Place 1 patch onto the skin every 24 hours (apply to neck---on 12 hours and off 12 hours) 30 patch 11    melatonin 3 MG tablet Take 1 tablet (3 mg) by mouth At Bedtime 90 tablet 3    mirabegron (MYRBETRIQ) 25 MG 24 hr tablet Take 1 tablet (25 mg) by mouth daily 90 tablet 3    Multiple Vitamin (MULTIVITAMIN ADULT) TABS Take 1 tablet by mouth daily 90 tablet 3    omeprazole (PRILOSEC) 20 MG DR capsule Take 1 capsule (20 mg) by mouth daily (Patient taking differently: Take 20 mg by mouth every other day) 90 capsule 3    polyethylene glycol 3350 POWD Take 17 g by mouth every 48 hours      polyvinyl alcohol (LIQUIFILM TEARS) 1.4 % ophthalmic solution Place 1 drop into both eyes every 4 hours as needed for dry eyes      senna (SENOKOT) 8.6 MG tablet Take 8.6 mg by mouth 2 times daily as needed      tamsulosin (FLOMAX) 0.4 MG capsule Take 1 capsule (0.4 mg) by mouth daily 90 capsule 3    vitamin B-12 (CYANOCOBALAMIN) 1000 MCG tablet Take 1 tablet (1,000 mcg) by mouth daily 90 tablet 3     Vitamin D3 (CHOLECALCIFEROL) 25 mcg (1000 units) tablet Take 1 tablet (25 mcg) by mouth daily 90 tablet 3      DISCHARGE PLAN:  Follow up labs: No labs orders/due  Medical Follow Up:      Follow up with primary care provider in 1-2 weeks  Appointment: Tuesday, May 14 at 2:20 visual field and glaucoma check; Dr. Abdi [facility] [phone]  Discharge Services: No therapy or home care recommended.   Discharge Instructions:   Continue to follow your diet:  regular.   Notify your PCP if you have a fever greater than 100.5 degrees.   24-hour supervision is recommended for safety.   OK to admit to secured unit    Discharge patient to home with current medications and treatments  Discharge Home with Home care as above  - Nursing call in 30 days supply of needed meds to pharmacy of choice upon discharge  - please send home with original of these discharge instructions and copy for chart.       ______________________________  Electronically signed:  SHERI Wong [facility]                   2/15/2024

## 2024-02-23 NOTE — PROGRESS NOTES
"[facility] GERIATRICS    Chief Complaint   Patient presents with    Nursing Home Acute     HPI:  Jacob Boland is a 89 year old  (1/11/1933), who is being seen today for an episodic care visit at: [facility]  () [[id number]]. Today's concern is: Seen today at nursing/patient request for report of depressive symptoms, patient reports feeling sad and lonely repeatedly to staff. He has also had angry outbursts at times. His memory has also declined since facility admission in October. He makes repetitive requests and has had several falls due to inability to recall need for saff assist. Seen today up on the unit in LTC. He offers limited history, is repetitive. He endorses feeling sad today. He would like assist to call his daughter and friend Anna.    Allergies, and PMH/PSH reviewed in EPIC today.  REVIEW OF SYSTEMS:  Limited secondary to cognitive impairment but today pt reports the above    Objective:   /68   Pulse 82   Temp 98.1  F (36.7  C)   Resp 17   Ht 1.753 m (5' 9\")   Wt 82.1 kg (181 lb)   SpO2 94%   BMI 26.73 kg/m    GENERAL APPEARANCE:  Alert, in no distress  RESP:  respiratory effort and palpation of chest normal, lungs clear to auscultation , no respiratory distress  CV:  Palpation and auscultation of heart done , regular rate and rhythm, no murmur, rub, or gallop, no edema  NEURO:   deal freely  PSYCH:  insight and judgement impaired, memory impaired , anxious    Labs done in SNF are in [facility]. Please refer to them using [facility] Everywhere. and Recent labs in [facility] reviewed by me today.     Assessment/Plan:  (F33.9) Recurrent major depressive disorder, remission status unspecified (H24)  (primary encounter diagnosis)  (F03.B4) Moderate dementia with anxiety, unspecified dementia type (H)  Comment: chronic depression and cognitive impairment with acute exacerbations. Suspect new environment is contirbuting after relocating from AL due to financial reasons. " Continues on duloxetine, will add citalopram which may also be helpful with dementia related behaviors, can be disruptive with angry outbursts at times  -management reviewed with nursing facility staff today  Plan: Start citalopram 10 mg daily, continue duloxetine, monitor mood, behavior. Follow-up with ACP as directed        Electronically signed by: SHERI Wong CNP

## 2024-02-23 NOTE — LETTER
"    2/23/2024        RE: Jacob Boland  [address] [address] [facility] [id number]        [facility] GERIATRICS    Chief Complaint   Patient presents with     Nursing Home Acute     HPI:  Jacob Boland is a 89 year old  (1/11/1933), who is being seen today for an episodic care visit at: [facility]  () [[id number]]. Today's concern is: Seen today at nursing/patient request for report of depressive symptoms, patient reports feeling sad and lonely repeatedly to staff. He has also had angry outbursts at times. His memory has also declined since facility admission in October. He makes repetitive requests and has had several falls due to inability to recall need for saff assist. Seen today up on the unit in LTC. He offers limited history, is repetitive. He endorses feeling sad today. He would like assist to call his daughter and friend Anna.    Allergies, and PMH/PSH reviewed in [facility] today.  REVIEW OF SYSTEMS:  Limited secondary to cognitive impairment but today pt reports the above    Objective:   /68   Pulse 82   Temp 98.1  F (36.7  C)   Resp 17   Ht 1.753 m (5' 9\")   Wt 82.1 kg (181 lb)   SpO2 94%   BMI 26.73 kg/m    GENERAL APPEARANCE:  Alert, in no distress  RESP:  respiratory effort and palpation of chest normal, lungs clear to auscultation , no respiratory distress  CV:  Palpation and auscultation of heart done , regular rate and rhythm, no murmur, rub, or gallop, no edema  NEURO:   deal freely  PSYCH:  insight and judgement impaired, memory impaired , anxious    Labs done in SNF are in [facility]. Please refer to them using [facility] Everywhere. and Recent labs in [facility] reviewed by me today.     Assessment/Plan:  (F33.9) Recurrent major depressive disorder, remission status unspecified (H24)  (primary encounter diagnosis)  (F03.B4) Moderate dementia with anxiety, unspecified dementia type (H)  Comment: chronic depression and cognitive impairment with acute exacerbations. " Suspect new environment is contirbuting after relocating from AL due to financial reasons. Continues on duloxetine, will add citalopram which may also be helpful with dementia related behaviors, can be disruptive with angry outbursts at times  -management reviewed with nursing facility staff today  Plan: Start citalopram 10 mg daily, continue duloxetine, monitor mood, behavior. Follow-up with ACP as directed        Electronically signed by: SHERI Wong CNP         Sincerely,        SHERI Wong CNP

## 2024-02-25 NOTE — TELEPHONE ENCOUNTER
[facility] Geriatrics   2024     Name: Jacob Boland   : 1933     Background:  Per staff report, frequently falls. Had a fall today and hit his head. VSS. Neuros intact. No injury or bruising. Not on blood thinners.    Orders:  Continue vital signs and neuro checks  Update with with any change    Electronically signed by SHERI Jacobson CNP

## 2024-03-07 NOTE — PROGRESS NOTES
[facility] GERIATRICS  Chief Complaint   Patient presents with    Annual Comprehensive Nursing Home     [place] Medical Record Number:  [id number]  Place of Service where encounter took place:  [facility] ([facility]3]  () [[id number]]    HPI:    Jacob Boland  is a 89 year old  (1/11/1933), who is being seen today for an annual comprehensive visit. HPI information obtained from: facility chart records, facility staff, patient report, [facility] chart review, and family/first contact Gregoria report.     By chart review, most recent hospitalization 4/24 - 4/26/23 at [facility] following a fall. Found down by EMS confused and agitated, completed rehab in TCU but was unable to return to previous living conditions so admitted to AL in July 2023. He has been fairly stable since, now admitting to long-term care for financial reasons. Awaiting CADI waiver to return to AL setting. Several falls in LTC with associated ED visits, without significant injury.      Seen today for follow-up up on the unit in LTC. He is anxious about a facility sponsored event later today and does not want to miss it. Reports his mood is okay. He is eating well. He reports regular bowel movements. He is denying acute concerns, CP, SOB, changes in b/b habits, fever/chills.     ALLERGIES: Atorvastatin, Naproxen, Pravastatin, and Simvastatin  PAST MEDICAL HISTORY:   Past Medical History:   Diagnosis Date    Anxiety     Chronic osteoarthritis     COPD (chronic obstructive pulmonary disease) (H)     Depressive disorder     Gastroesophageal reflux disease       PAST SURGICAL HISTORY:  has a past surgical history that includes joint replacement, hip rt/lt (Right) and Lumbar Laminectomy (2006).      Current Outpatient Medications:     acetaminophen (TYLENOL) 500 MG tablet, Take 2 tablets (1,000 mg) by mouth 3 times daily, Disp: 540 tablet, Rfl: 3    albuterol (PROAIR HFA/PROVENTIL HFA/VENTOLIN HFA) 108 (90 Base) MCG/ACT inhaler, Inhale 1-2 puffs into  the lungs every 4 hours as needed, Disp: , Rfl:     calcium carbonate (TUMS) 500 MG chewable tablet, Take 1 chew tab by mouth 3 times daily as needed for heartburn, Disp: , Rfl:     citalopram (CELEXA) 10 MG tablet, Take 20 mg by mouth daily, Disp: , Rfl:     diclofenac (VOLTAREN) 1 % topical gel, APPLY 4 GRAMS TOPICALLY TO BOTH KNEES TWICE DAILY, Disp: 200 g, Rfl: 11    Lidocaine (LIDOCARE) 4 % Patch, Place 1 patch onto the skin every 24 hours (apply to neck---on 12 hours and off 12 hours), Disp: 30 patch, Rfl: 11    melatonin 3 MG tablet, Take 1 tablet (3 mg) by mouth At Bedtime, Disp: 90 tablet, Rfl: 3    mirabegron (MYRBETRIQ) 25 MG 24 hr tablet, Take 1 tablet (25 mg) by mouth daily, Disp: 90 tablet, Rfl: 3    Multiple Vitamin (MULTIVITAMIN ADULT) TABS, Take 1 tablet by mouth daily, Disp: 90 tablet, Rfl: 3    omeprazole (PRILOSEC) 20 MG DR capsule, Take 20 mg by mouth every other day, Disp: , Rfl:     polyethylene glycol 3350 POWD, Take 17 g by mouth every 48 hours, Disp: , Rfl:     polyvinyl alcohol (LIQUIFILM TEARS) 1.4 % ophthalmic solution, Place 1 drop into both eyes every 4 hours as needed for dry eyes, Disp: , Rfl:     senna (SENOKOT) 8.6 MG tablet, Take 8.6 mg by mouth 2 times daily as needed, Disp: , Rfl:     tamsulosin (FLOMAX) 0.4 MG capsule, Take 1 capsule (0.4 mg) by mouth daily, Disp: 90 capsule, Rfl: 3    vitamin B-12 (CYANOCOBALAMIN) 1000 MCG tablet, Take 1 tablet (1,000 mcg) by mouth daily, Disp: 90 tablet, Rfl: 3    Vitamin D3 (CHOLECALCIFEROL) 25 mcg (1000 units) tablet, Take 1 tablet (25 mcg) by mouth daily, Disp: 90 tablet, Rfl: 3     MED REC REQUIRED  Post Medication Reconciliation Status: medication reconcilation previously completed during another office visit      Case Management:  I have reviewed the facility/SNF care plan/MDS, including the falls risk, nutrition and pain screening. I also reviewed the current immunizations, and preventive care.. Future cancer screening is not  "clinically indicated secondary to age/goals of care. Patient's desire to return to the community is present, but is not able due to care needs . Current Level of Care is appropriate.mhgeroimmunization: Provider working with patient, first contact, and facility to coordinate    Advance Directive Discussion:    I reviewed the current advanced directives as reflected in EPIC, the POLST and the facility chart, and verified the congruency of orders. I contacted the first party Gregoria and discussed the plan of care. I did review the advance directives with the resident.     Team Discussion:  I communicated with the appropriate disciplines involved with the Plan of Care: Nursing  .   Patient's goal is: pain control and comfort.  Information reviewed: Medications, vital signs, orders, and nursing notes.    ROS:  10 point ROS of systems including Constitutional, Eyes, Respiratory, Cardiovascular, Gastroenterology, Genitourinary, Integumentary, Musculoskeletal, Psychiatric were all negative except for pertinent positives noted in my HPI.    Vitals:  /61   Pulse 78   Temp 97.7  F (36.5  C)   Resp 18   Ht 1.753 m (5' 9\")   Wt 82.1 kg (181 lb)   SpO2 97%   BMI 26.73 kg/m   Body mass index is 26.73 kg/m .  Exam:  GENERAL APPEARANCE:  Alert, in no distress  ENT: mucus membranes moist, [place]  RESP:  respiratory effort and palpation of chest normal, lungs clear to auscultation , no respiratory distress  CV:  Palpation and auscultation of heart done , regular rate and rhythm, no murmur, rub, or gallop, no edema  ABDOMEN:  normal bowel sounds, soft, nontender, no hepatosplenomegaly or other masses  M/S:   Gait and station abnormal transfers with assist, wheelchair for mobility  SKIN:  Inspection of skin and subcutaneous tissue baseline, Palpation of skin and subcutaneous tissue baseline  NEURO:   deal freely, follows commands  PSYCH:  memory impaired , affect and mood normal     Lab/Diagnostic data:   Labs done in SNF are in " [facility]. Please refer to them using EPIC/Care Everywhere. and Recent labs in EPIC reviewed by me today.     ASSESSMENT/PLAN  (F33.9) Recurrent major depressive disorder, remission status unspecified (H24)  (primary encounter diagnosis)  Comment: chronic, with anxiety.   Plan: continue citalopram, duloxetine, monitor mood, symptoms. Follow-up with ACP per recommendations    (F03.B4) Moderate dementia with anxiety, unspecified dementia type (H)  Comment: chronic, STML, low functional status. Confounded by hearing loss. Impulsivity contributes to frequent falls.   -management reviewed with nursing facility staff today, would benefit from specialized memory care setting  Plan: continue citalopram, LTC for assist with ADLs, medication management, meals, activities.     (J44.9) Chronic obstructive pulmonary disease, unspecified COPD type (H)  Comment: chronic, by history   Plan: continue albuterol PRN    (N40.1,  R35.1) Benign prostatic hyperplasia with nocturia  Comment: chronic, ongoing  Plan: continue myrbetriq, tamsulsoin    (M15.9) Primary osteoarthritis involving multiple joints  (R29.6) Falls frequently  (M25.561,  M25.562,  G89.29) Chronic pain of both knees  (G60.9) Idiopathic neuropathy  Comment: chronic, history of frequent falls  Plan: continue fall precuations, D3 for bone heatlh, diclofenac, lidocaine, tylenol.     (D64.9) Anemia, unspecified type  Comment: chronic, mild  Hemoglobin   Date Value Ref Range Status   12/06/2023 12.0 (L) 13.3 - 17.7 g/dL Final   11/08/2023 11.9 (L) 13.3 - 17.7 g/dL Final   Plan: continue B12, monitor hgb periodically    (K21.9) Gastroesophageal reflux disease, unspecified whether esophagitis present  Comment: chronic, ongoing  Plan: continue PPI, PRN tums    (K59.01) Slow transit constipation  Comment: chronic  Plan: cotninue senna bid, miralax QOD    (G47.00) Insomnia, unspecified type  Comment: chronic, not currently on medications  Plan: monitor symptoms    (Z13.6)  Screening for hypertension  Comment: no history of HTN  BP Readings from Last 8 Encounters:   03/07/24 112/61   02/23/24 133/68   01/12/24 117/72   Plan: monitor BP per [facility] protocol      Electronically signed by:  SHERI Wong CNP

## 2024-03-07 NOTE — LETTER
3/7/2024        RE: Jacob Boland  [address] [address] [address]        No notes on file      Sincerely,        SHERI Wong CNP

## 2024-03-12 NOTE — LETTER
"    3/12/2024        RE: Jacob Boland  [address] [address] [address]        [facility] GERIATRICS    Chief Complaint   Patient presents with     RECHECK     HPI:  Jacob Boland is a 89 year old  (1/11/1933), who is being seen today for an episodic care visit at: [facility]  () [[id number]]. Today's concern is: Seen today at nursing request for report of lower extremity wounds noted 3/11. Seen today up on the unit in LTC. Patient offers a confused histroy, states he is not doing well after spilling his coffee at breakfast. He reports he has wounds on his left leg which started a few weeks ago. He feels they are doing better. They started as raised bumps which were growing out of the skin but now they are flat. He picks at them occasionally. They are painful when touched. He is denying CP, SOB, changes in b/b habits, NVD, fever/chills, dizziness or light headedness.    Allergies, and PMH/PSH reviewed in [facility] today.  REVIEW OF SYSTEMS:  Limited secondary to cognitive impairment but today pt reports the above    Objective:   /70   Pulse 75   Temp 97.6  F (36.4  C)   Resp 18   Ht 1.753 m (5' 9\")   Wt 83 kg (183 lb)   SpO2 96%   BMI 27.02 kg/m    GENERAL APPEARANCE:  Alert, in no distress  RESP:  respiratory effort and palpation of chest normal, lungs clear to auscultation , no respiratory distress  CV:  Palpation and auscultation of heart done , regular rate and rhythm, no murmur, rub, or gallop, peripheral edema 1+ in LLE  ABDOMEN:  normal bowel sounds, soft, nontender, no hepatosplenomegaly or other masses  M/S:   Gait and station abnormal transfers with assist, wheelchair for mobility  SKIN:  left shin with 7 round lesions, yellow sloughy wound beds, moderate serous drainage, moderately tender, periwound red, warm. Lef thigh with 1 round and 1 linear lesiosn, periwound moderately raised, non-tender, no warmth or edema  NEURO:   deal freely, follows commands, " fatigued  PSYCH:  memory impaired     Labs done in SNF are in [facility]. Please refer to them using EPIC/Care Everywhere. and Recent labs in EPIC reviewed by me today.     Assessment/Plan:  (L03.116) Cellulitis of left lower extremity  (primary encounter diagnosis)  (S71.102A) Open wound of left thigh, initial encounter  S80.812A) Abrasion of anterior left lower leg, initial encounter  (Z23) Need for Tdap vaccination  Comment: new wounds to LLE shin and thigh, unclear etiology. Shin wounds are abnormally round although he does endorse picking at them. Left shin appears acutely infected with errythema and tenderness, unilateral LE swelling - increased redness when compared with images taken yesterday in [facility]. Possible vascular component? May benefit from compression. Will treat with keflex, local wound care, monitor. He has had falls recently, possible puncture wounds from wc? Will update tetanus as he is due this year and high risk for injury from falls.  -management reviewed with nursing facility staff today  Estimated Creatinine Clearance: 69.7 mL/min (based on SCr of 0.81 mg/dL).  Plan: Keflex 500 mg TID x 5 days. Continue local wound care measures as ordered, routine skin monitoring per nursing. OK for compression once acute infection resolves. Encourage elevation, avoid picking, keep nails trimmed short. Tylenol for elevation. Update TDAP.       Electronically signed by: SHERI Wong CNP         Sincerely,        SHERI Wong CNP

## 2024-03-12 NOTE — PROGRESS NOTES
"[facility] GERIATRICS    Chief Complaint   Patient presents with    RECHECK     HPI:  Jacob Boland is a 89 year old  (1/11/1933), who is being seen today for an episodic care visit at: [facility]  () [[id number]]. Today's concern is: Seen today at nursing request for report of lower extremity wounds noted 3/11. Seen today up on the unit in LTC. Patient offers a confused histroy, states he is not doing well after spilling his coffee at breakfast. He reports he has wounds on his left leg which started a few weeks ago. He feels they are doing better. They started as raised bumps which were growing out of the skin but now they are flat. He picks at them occasionally. They are painful when touched. He is denying CP, SOB, changes in b/b habits, NVD, fever/chills, dizziness or light headedness.    Allergies, and PMH/PSH reviewed in EPIC today.  REVIEW OF SYSTEMS:  Limited secondary to cognitive impairment but today pt reports the above    Objective:   /70   Pulse 75   Temp 97.6  F (36.4  C)   Resp 18   Ht 1.753 m (5' 9\")   Wt 83 kg (183 lb)   SpO2 96%   BMI 27.02 kg/m    GENERAL APPEARANCE:  Alert, in no distress  RESP:  respiratory effort and palpation of chest normal, lungs clear to auscultation , no respiratory distress  CV:  Palpation and auscultation of heart done , regular rate and rhythm, no murmur, rub, or gallop, peripheral edema 1+ in LLE  ABDOMEN:  normal bowel sounds, soft, nontender, no hepatosplenomegaly or other masses  M/S:   Gait and station abnormal transfers with assist, wheelchair for mobility  SKIN:  left shin with 7 round lesions, yellow sloughy wound beds, moderate serous drainage, moderately tender, periwound red, warm. Lef thigh with 1 round and 1 linear lesiosn, periwound moderately raised, non-tender, no warmth or edema  NEURO:   deal freely, follows commands, fatigued  PSYCH:  memory impaired     Labs done in SNF are in [place] EPIC. Please refer to them " using [facility]/Care Everywhere. and Recent labs in [facility] reviewed by me today.     Assessment/Plan:  (L03.116) Cellulitis of left lower extremity  (primary encounter diagnosis)  (S71.102A) Open wound of left thigh, initial encounter  S80.812A) Abrasion of anterior left lower leg, initial encounter  (Z23) Need for Tdap vaccination  Comment: new wounds to LLE shin and thigh, unclear etiology. Shin wounds are abnormally round although he does endorse picking at them. Left shin appears acutely infected with errythema and tenderness, unilateral LE swelling - increased redness when compared with images taken yesterday in [facility]. Possible vascular component? May benefit from compression. Will treat with keflex, local wound care, monitor. He has had falls recently, possible puncture wounds from wc? Will update tetanus as he is due this year and high risk for injury from falls.  -management reviewed with nursing facility staff today  Estimated Creatinine Clearance: 69.7 mL/min (based on SCr of 0.81 mg/dL).  Plan: Keflex 500 mg TID x 5 days. Continue local wound care measures as ordered, routine skin monitoring per nursing. OK for compression once acute infection resolves. Encourage elevation, avoid picking, keep nails trimmed short. Tylenol for elevation. Update TDAP.       Electronically signed by: SHERI Wong CNP

## 2024-03-13 NOTE — ED PROVIDER NOTES
History     Chief Complaint:  Suicidal     HPI   Jacob Boland is a 89 year old male with a history of depression, COPD, dementia presenting today for evaluation of suicidal ideation reported from transitional care unit.  Patient reports that he has had wounds on his legs for the last 2 years and lately they have been looking improved.  He does admit to suicidal ideation with intent 1 year ago, but denies any suicidal thoughts over the last 6 months since he became more involved in activities he cares about.  He denies any chest pain, abdominal pain, fevers. Recalls a fall yesterday in which he slipped out of bed and did not hit his head. He has no dysuria, diarrhea, vomiting.  Reports his breathing feels like it is at baseline.    Independent Historian:   I spoke with the charge nurse from patient's facility.  She states altered mental status and suicidal ideation states are new today.  She has concerned regarding adverse reaction to Keflex which was just started yesterday.     Review of External Notes:   Started on Keflex yesterday for cellulitis of the left anterior shin.     Medications:    acetaminophen (TYLENOL) 500 MG tablet  albuterol (PROAIR HFA/PROVENTIL HFA/VENTOLIN HFA) 108 (90 Base) MCG/ACT inhaler  calcium carbonate (TUMS) 500 MG chewable tablet  cephALEXin (KEFLEX) 500 MG capsule  citalopram (CELEXA) 10 MG tablet  diclofenac (VOLTAREN) 1 % topical gel  DULoxetine (CYMBALTA) 30 MG capsule  Lidocaine (LIDOCARE) 4 % Patch  melatonin 3 MG tablet  mirabegron (MYRBETRIQ) 25 MG 24 hr tablet  Multiple Vitamin (MULTIVITAMIN ADULT) TABS  omeprazole (PRILOSEC) 20 MG DR capsule  polyethylene glycol 3350 POWD  polyvinyl alcohol (LIQUIFILM TEARS) 1.4 % ophthalmic solution  senna (SENOKOT) 8.6 MG tablet  tamsulosin (FLOMAX) 0.4 MG capsule  vitamin B-12 (CYANOCOBALAMIN) 1000 MCG tablet  Vitamin D3 (CHOLECALCIFEROL) 25 mcg (1000 units) tablet      Past Medical History:    Past Medical History:   Diagnosis Date     Anxiety     Chronic osteoarthritis     COPD (chronic obstructive pulmonary disease) (H)     Depressive disorder     Gastroesophageal reflux disease      Past Surgical History:    Past Surgical History:   Procedure Laterality Date    JOINT REPLACEMENT, HIP RT/LT Right     LUMBAR LAMINECTOMY  2006      Physical Exam   Patient Vitals for the past 24 hrs:   BP Temp Temp src Pulse Resp SpO2   03/13/24 1820 -- -- -- -- -- 95 %   03/13/24 1750 132/56 -- -- 68 -- 97 %   03/13/24 1735 120/59 98.8  F (37.1  C) Oral 68 18 98 %        Physical Exam  /56   Pulse 68   Temp 98.8  F (37.1  C) (Oral)   Resp 18   SpO2 95%    General: Appears stated age.  Pleasant and conversant elderly gentleman.  Examined in ED13.   Head: Atraumatic, specifically no raccoon eyes or velasquez's sign.  No scalp tenderness to palpation.  EENT: PERRL. EOMI. No hemotympanum. Moist mucus membranes. No tongue abrasions, oral lacerations, or dental injury.   Neck: Trachea midline. No cervical spinous process tenderness.    CV: Regular rate and rhythm. No appreciable murmurs, rubs, or gallops.   Respiratory: Breathing comfortably on room air. Chest expansion is symmetric. No chest wall tenderness. Mild diffuse inspiratory wheezing.  GI: Soft, non-distended. Non-tender abdomen. No rebound, rigidity, or guarding.   Msk: 1+ pitting edema bilaterally that is symmetrical.   Upper extremities: No tenderness to the shoulders, elbows, or wrists bilaterally. Able to fully range upper extremities.   Lower extremities: No tenderness to the knees or ankles bilaterally. Able to fully range lower extremities.    Pelvis: Stable. No tenderness with palpation of the pelvis. No lower extremity rotation or shortening.   Back: No thoracic or lumbar spinous process tenderness.   Skin: Warm and dry. Chronic appearing wounds on bilateral lower extremities without any obvious cellulitis.  No warmth to touch of wounds.     Neuro: Awake, alert, and conversant. Oriented to  person, place, and situation.  Pleasantly demented.  Psych: Denies suicidal or homicidal ideation.    Emergency Department Course   Imaging:  Head CT w/o contrast   Final Result   IMPRESSION:   1.  No acute intracranial process or significant change since 12/06/2023.         Report per radiology    Laboratory:  Labs Ordered and Resulted from Time of ED Arrival to Time of ED Departure   CBC WITH PLATELETS AND DIFFERENTIAL - Abnormal       Result Value    WBC Count 7.7      RBC Count 3.90 (*)     Hemoglobin 11.4 (*)     Hematocrit 36.0 (*)     MCV 92      MCH 29.2      MCHC 31.7      RDW 14.6      Platelet Count 203      % Neutrophils 47      % Lymphocytes 36      % Monocytes 11      % Eosinophils 5      % Basophils 1      % Immature Granulocytes 0      NRBCs per 100 WBC 0      Absolute Neutrophils 3.6      Absolute Lymphocytes 2.8      Absolute Monocytes 0.8      Absolute Eosinophils 0.4      Absolute Basophils 0.1      Absolute Immature Granulocytes 0.0      Absolute NRBCs 0.0     BASIC METABOLIC PANEL - Normal    Sodium 135      Potassium 4.2      Chloride 98      Carbon Dioxide (CO2) 29      Anion Gap 8      Urea Nitrogen 19.8      Creatinine 0.87      GFR Estimate 81      Calcium 8.7      Glucose 84     INFLUENZA A/B, RSV, & SARS-COV2 PCR - Normal    Influenza A PCR Negative      Influenza B PCR Negative      RSV PCR Negative      SARS CoV2 PCR Negative        Emergency Department Course & Assessments:  Interventions:  Medications - No data to display     Assessments and Consultations:  Patient was seen in conjunction with attending physician Dr. Wright.    1755   I performed my initial evaluation of the patient  ED Course as of 03/13/24 2221   Wed Mar 13, 2024   1914 Spoke with charge nurse from patient's facility.    1948 Rechecked patient.  Appears comfortable.  Continues to deny active suicidal ideation.     Independent Interpretation (X-rays, CTs, rhythm strip):  None    Social Determinants of Health  affecting care:   Has dementia.  L[facility] in [facility].    Disposition:  The patient was discharged to skilled nursing facility.     Impression & Plan    Medical Decision Makin year old with known dementia here as above.  On arrival, vital signs are stable.  Sent here by SNF with concerns for suicidal ideation and altered mental status.  On my exam, patient declines suicidal ideation, was oriented to person, place, and situation.  Exam otherwise reassuring.  No cellulitis visualized on my exam.  Differential included infection, medication reaction, intracranial abnormality.  Lab work obtained as above was reassuring.  He does not have any physical concerns here.  We attempted on numerous occasions to obtain a urine sample to assess for UTI.  At this time, do not feel there is significant benefit in obtaining urine sample given that the patient has no urinary complaints, no vital sign abnormalities, and no history of UTI.      At this time, patient has denied on numerous occasions that he has any suicidal ideation.  He has been very forthcoming with suicidal thoughts he had 1 year ago and denies any of these thoughts for the last 6 months.  He cites his children and grandchildren as reasons to live.  I do not think he would benefit from a DEC evaluation given his continued denial of suicidal ideation and dementia.    Given no evidence of cellulitis and reported behavioral change (though hard to assess) will recommend cessation of Keflex.  This medication may have been the culprit of changes noted by SNF.  He will discharged to skilled nursing facility where they can continue to monitor his symptoms closely.    Diagnosis:    ICD-10-CM    1. Confusion  R41.0       2. Moderate dementia without behavioral disturbance, psychotic disturbance, mood disturbance, or anxiety, unspecified dementia type (H)  F03.B0         Mari Cano PA-C  2024   [facility]            Mari Cano PA-C  03/13/24 [id number]

## 2024-03-13 NOTE — ED PROVIDER NOTES
ED ATTENDING PHYSICIAN NOTE:   I evaluated this patient in conjunction with Mari Cano PA-C. I have participated in the care of the patient and personally performed key elements of the history, exam, and medical decision making.     HPI:   Jacob Boland is a pleasant 89 year old male with history of moderate dementia being sent to emergency department from transitional care unit with concern for suicidal ideation.  On arrival, patient adamantly denies any suicidal ideation.  He states he was telling a friend about an episode of suicidal ideation about 1 year ago.  He has no new concerns in this respect.  He was started on cephalexin yesterday for concern for cellulitis for wounds on his left leg.  He feels they are improved compared to previously and they have been there for approximately 2 years.  He does not have any other concerns at this time.  Patient has reportedly had recent fall.    Independent Historian:   PA spoke with charge nurse from patient's facility.    Review of External Notes:  I personally reviewed notes from the patient's progress note dated yesterday. This provided me with information regarding patient's baseline medical problems and patient's recent clinical course.      EXAM:   Patient Vitals for the past 24 hrs:   BP Temp Temp src Pulse Resp SpO2   03/13/24 1820 -- -- -- -- -- 95 %   03/13/24 1750 132/56 -- -- 68 -- 97 %   03/13/24 1735 120/59 98.8  F (37.1  C) Oral 68 18 98 %      Physical Exam  Vitals and nursing note reviewed.   HENT:      Head: Atraumatic.   Cardiovascular:      Rate and Rhythm: Normal rate and regular rhythm.   Pulmonary:      Effort: Pulmonary effort is normal.      Breath sounds: Normal breath sounds.   Abdominal:      Palpations: Abdomen is soft.      Tenderness: There is no abdominal tenderness.   Skin:     General: Skin is warm and dry.   Neurological:      Mental Status: He is alert. Mental status is at baseline.         Independent Interpretation (X-rays,  CTs, rhythm strip):  I independently interpreted the patient's non-contrast head CT; reassuring against acute intracranial hemorrhage.    Consultations/Discussion of Management or Tests:  None    Social Determinants of Health affecting care:   None.       MEDICAL DECISION MAKING/ASSESSMENT AND PLAN:   Patient with dementia presenting with concern for suicidal ideation.  Patient is adamant that he does not have SI at this time.  I do not think he requires further psychiatric evaluation based on my assessment.  Exam is unremarkable.  Vitals are reassuring.  Did consider causes of increased confusion or delirium on dementia including metabolic abnormality or electrolyte derangement, occult infection, medication side effect.  Do question whether starting cephalexin may be causing patient's altered mental status.  On my evaluation, I do not feel patient's wounds are consistent with cellulitis.  There does appear to be some venous stasis dermatitis and some chronic appearing ulcerations with pink tissue surrounding them.  Will recommend the patient's stop cephalexin.  We did obtain basic workup a, which was reassuring.  We attempted to obtain a urinalysis but were unable to ultimately the patient refused.  Given patient has no symptoms, is a male, do not feel that risks of continuing to pursue this equal to benefits.  Will plan for patient be discharged to facility with return precautions     DIAGNOSIS:     ICD-10-CM    1. Confusion  R41.0       2. Moderate dementia without behavioral disturbance, psychotic disturbance, mood disturbance, or anxiety, unspecified dementia type (H)  F03.B0            DISPOSITION:   The patient was discharged to home.        Niels Wright MD  03/14/24 0001

## 2024-03-13 NOTE — ED TRIAGE NOTES
Pt comes from [facility] for making suicidal comments throughout the day, pt has hx of dementia and staff states increase in confusion over the past few days. Pt denies suicidal thoughts currently but c/o body pain. Pt also diagnosed with left leg cellulitis and started on Keflex yesterday. ABC's intact, pt alert and oriented to self.      Triage Assessment (Adult)       Row Name 03/13/24 5050          Triage Assessment    Airway WDL WDL        Respiratory WDL    Respiratory WDL WDL        Skin Circulation/Temperature WDL    Skin Circulation/Temperature WDL WDL        Cardiac WDL    Cardiac WDL WDL        Peripheral/Neurovascular WDL    Peripheral Neurovascular WDL WDL        Cognitive/Neuro/Behavioral WDL    Cognitive/Neuro/Behavioral WDL WDL

## 2024-03-14 NOTE — PATIENT INSTRUCTIONS
Ronaldo Boland  1/11/1933     CXR 2 view dx: cough, confusion      SHERI Wong CNP on 3/14/2024 at 1:30 PM

## 2024-03-14 NOTE — DISCHARGE INSTRUCTIONS
Stop Keflex  -- suspect this may be the cause of his increased confusion.  No evidence of cellulitis today.  Have low suspicion to return for fevers, worsening altered mental status, further psychiatric concerns.  Jacob repeatedly denied thoughts of suicide here.

## 2024-03-14 NOTE — LETTER
"    3/14/2024        RE: Jacob Boland  [address] [address] [id number]        [facility]    Chief Complaint   Patient presents with     Nursing Home Acute     HPI:  Jacob Boland is a 89 year old  (1/11/1933), who is being seen today for an episodic care visit at: [facility]  () [[id number]].     Today's concern is: Seen today for follow-up, recent concern for cellulitis 3/12 and in ED yesterday 3/13 after making suicidal statements to staff. In ED, CBC, BMP grossly normal. CT head without acute findings. He was denying SI, reported he was discussing a depressive episode several years ago. Attempted to collect UA/UC but patient was not cooperative. Wound appeared improved and he keflex was discontinued; patient was discharged back to LT where he resides permanently.    Seen today in his room in LTC up to wheelchair. He is pleasantly confused. Seems to recall being in the hospital but tells a vague and confusing history, talking about his wife and applying for jobs. He is denying SI. Mood is \"good.\" His leg wounds are feeling improved. He denies pain. He is denying CP, SOB, changes in b/b habits, fever/chills.     Allergies, and PMH/PSH reviewed in [facility] today.  REVIEW OF SYSTEMS:  Limited secondary to cognitive impairment but today pt reports the above and 4 point ROS including Respiratory, CV, GI and , other than that noted in the HPI,  is negative    Objective:   /84   Pulse 88   Temp 97.5  F (36.4  C)   Resp 18   Ht 1.753 m (5' 9\")   Wt 83 kg (183 lb)   SpO2 97%   BMI 27.02 kg/m    GENERAL APPEARANCE:  Alert, in no distress  RESP:  respiratory effort and palpation of chest normal, lungs clear to auscultation , no respiratory distress  CV:  Palpation and auscultation of heart done , regular rate and rhythm, no murmur, rub, or gallop, 1+ edema in LLE  ABDOMEN:  normal bowel sounds, soft, nontender, no hepatosplenomegaly or other masses  M/S:   Gait " and station abnormal transfers with assist, wheelchair for mobility  SKIN:  Inspection of skin and subcutaneous tissue baseline, Palpation of skin and subcutaneous tissue baseline, left shin with 6 areas of ulceration, mild periwound erythema, no drainage, non-tender  NEURO:   deal freely, follows simple commands  PSYCH:  memory impaired, oriented to self/situation, affect and mood normal    Labs done in SNF are in [place] EPIC. Please refer to them using EPIC/Care Everywhere. and Recent labs in EPIC reviewed by me today.     Assessment/Plan:  (S80.532A) Abrasion of anterior left lower leg, initial encounter  (primary encounter diagnosis)  Comment: acute, resolving. Keflex was stopped in ED, thought possibly contributing to confusion. Think this is unlikely, but wound is significantly improved after a short course of antibiotics. Will continue to monitor off of abx, discussed with nursing facility staff, monitor closely, notify provider if recurrent redness, drainage, tenderness.  Plan: monitor for s/sx infection recurrence.     (F33.9) Recurrent major depressive disorder, remission status unspecified (H24)  Comment: chronic, with comments about SI reported 3/13. Denying at present. Has endorsed depressed mood. Previously started citalopram, will increase dose, stop duloxetine. Consider adding remeron if symptoms persist.   Plan: Stop duloxetine. Increase citalopram to 20 mg daily. Monitor mood, behavior. Follow-up with psychology as directed.     (F03.B4) Moderate dementia with anxiety, unspecified dementia type (H)  Comment: chronic, ongoing. Worsening STML and functional decline since admission in October. Mental status waxes and wanes, alert to self, situation at baseline. CT head has been unrevealing.  following, working on memory care placement.   Plan: LTC for assist with ADLs, medication management, meals, activities.       (R05.1) Acute cough  (R41.0) Confusion  Comment: Nursing called to  report patient needed increased assist with feeding today and after meal was coughing repeatedly with large volumes of sputum. VSS. Afebrile. No leukocytosis in ED yesterday. Will check CXR given new cough and ongoing concern for altered mentation.   Plan: CXR 2 view. Monitor for s/sx infection. Nursing to assist with meals as needed.       Electronically signed by: SHERI Wong CNP         Sincerely,        SHERI Wong CNP

## 2024-03-14 NOTE — ED NOTES
Call to maxi Arguello w/ charge RN. Updated on pt status and disposition. Pt to return via stretcher transport.

## 2024-03-14 NOTE — ED NOTES
Straight cath attempted by ERTs. Pt began to void just prior to insertion. Unable to catch sample. Pt remains confused. Has been provided w/ box meal and soda.

## 2024-03-14 NOTE — ED NOTES
Pt found sitting on edge of bed naked. Requesting his jacket and to go home. Pt assisted back into gown and into bed. Bed alarm placed for safety.

## 2024-03-14 NOTE — PROGRESS NOTES
"[facility] GERIATRICS    Chief Complaint   Patient presents with    Nursing Home Acute     HPI:  Jacob Boland is a 89 year old  (1/11/1933), who is being seen today for an episodic care visit at: [facility]  () [[id number]].     Today's concern is: Seen today for follow-up, recent concern for cellulitis 3/12 and in ED yesterday 3/13 after making suicidal statements to staff. In ED, CBC, BMP grossly normal. CT head without acute findings. He was denying SI, reported he was discussing a depressive episode several years ago. Attempted to collect UA/UC but patient was not cooperative. Wound appeared improved and he keflex was discontinued; patient was discharged back to LT where he resides permanently.    Seen today in his room in LTC up to wheelchair. He is pleasantly confused. Seems to recall being in the hospital but tells a vague and confusing history, talking about his wife and applying for jobs. He is denying SI. Mood is \"good.\" His leg wounds are feeling improved. He denies pain. He is denying CP, SOB, changes in b/b habits, fever/chills.     Allergies, and PMH/PSH reviewed in EPIC today.  REVIEW OF SYSTEMS:  Limited secondary to cognitive impairment but today pt reports the above and 4 point ROS including Respiratory, CV, GI and , other than that noted in the HPI,  is negative    Objective:   /84   Pulse 88   Temp 97.5  F (36.4  C)   Resp 18   Ht 1.753 m (5' 9\")   Wt 83 kg (183 lb)   SpO2 97%   BMI 27.02 kg/m    GENERAL APPEARANCE:  Alert, in no distress  RESP:  respiratory effort and palpation of chest normal, lungs clear to auscultation , no respiratory distress  CV:  Palpation and auscultation of heart done , regular rate and rhythm, no murmur, rub, or gallop, 1+ edema in LLE  ABDOMEN:  normal bowel sounds, soft, nontender, no hepatosplenomegaly or other masses  M/S:   Gait and station abnormal transfers with assist, wheelchair for mobility  SKIN:  Inspection of skin " and subcutaneous tissue baseline, Palpation of skin and subcutaneous tissue baseline, left shin with 6 areas of ulceration, mild periwound erythema, no drainage, non-tender  NEURO:   deal freely, follows simple commands  PSYCH:  memory impaired, oriented to self/situation, affect and mood normal    Labs done in SNF are in [place] EPIC. Please refer to them using EPIC/Care Everywhere. and Recent labs in EPIC reviewed by me today.     Assessment/Plan:  (S88.199P) Abrasion of anterior left lower leg, initial encounter  (primary encounter diagnosis)  Comment: acute, resolving. Keflex was stopped in ED, thought possibly contributing to confusion. Think this is unlikely, but wound is significantly improved after a short course of antibiotics. Will continue to monitor off of abx, discussed with nursing facility staff, monitor closely, notify provider if recurrent redness, drainage, tenderness.  Plan: monitor for s/sx infection recurrence.     (F33.9) Recurrent major depressive disorder, remission status unspecified (H24)  Comment: chronic, with comments about SI reported 3/13. Denying at present. Has endorsed depressed mood. Previously started citalopram, will increase dose, stop duloxetine. Consider adding remeron if symptoms persist.   Plan: Stop duloxetine. Increase citalopram to 20 mg daily. Monitor mood, behavior. Follow-up with psychology as directed.     (F03.B4) Moderate dementia with anxiety, unspecified dementia type (H)  Comment: chronic, ongoing. Worsening STML and functional decline since admission in October. Mental status waxes and wanes, alert to self, situation at baseline. CT head has been unrevealing.  following, working on memory care placement.   Plan: LTC for assist with ADLs, medication management, meals, activities.       (R05.1) Acute cough  (R41.0) Confusion  Comment: Nursing called to report patient needed increased assist with feeding today and after meal was coughing repeatedly  with large volumes of sputum. VSS. Afebrile. No leukocytosis in ED yesterday. Will check CXR given new cough and ongoing concern for altered mentation.   Plan: CXR 2 view. Monitor for s/sx infection. Nursing to assist with meals as needed.       Electronically signed by: SHERI Wong CNP

## 2024-03-15 NOTE — CONFIDENTIAL NOTE
[facility] Geriatrics Telephone Encounter    HPI: 89 yo male PMH dementia, depression in LTC. Recent concern for SI, seen in ED and denied. Citalopram increased and stopped duloxetine.     Reason for Call: Nursing reporting patient has not slept over night x 2 days. Recently relocated to a single room on a different floor due to poor match with previous roommate. He has been more confused, needing more assist with cares, referencing discussions with his wife who is . CXR was negative. LLE wound is stable.    Onset: gradual     Symptoms: Insomnia, confusion     Interventions Tried: Citalopram.      A/P: acute on chronic confusion, perhaps some component of delirium with recent room move and poor sleep. Spoke with patient's daughter, she is not concerned regarding previous comments about SI, feels he did not have intent and that comments were made out of frustration over placement and roommate concerns. She acknowledges chronic progressive memory and functional decline. Some of his comments are not based in reality. We discussed pharmacological measures in addition to current measures. Discussed short term trial of seroquel for delirium-like symptoms, insomnia and risks/benefits of antipsychotic use in elderly. Discussed trial of remeron which may be helpful with symptoms and associated risks/benefits. She is in favor of remeron and will start low dose to help with sleep, mood, anxiety. Also reviewed current wound care management, edema wear, GERD treatment, no changes to current plans.    Imaging Ordered: No    Labs Ordered: no.    Medication Ordered:  Remeron     Sent to ED:  No    Orders  Jacob Boland  1933     Remeron 7.5 mg QHS      SHERI Wong CNP on 3/15/2024 at 4:53 PM

## 2024-03-27 NOTE — ED TRIAGE NOTES
Pt arrives by EMS from AL after falling out of bed and complaining of neck pain. Pt states he hit his head. Fall was unwitnessed. Pt could not tolerate c collar for EMS. No thinners on pt med list.     Triage Assessment (Adult)       Row Name 03/27/24 0836          Triage Assessment    Airway WDL WDL        Respiratory WDL    Respiratory WDL WDL        Skin Circulation/Temperature WDL    Skin Circulation/Temperature WDL WDL        Cardiac WDL    Cardiac WDL X;rhythm     Pulse Rate & Regularity bradycardic        Peripheral/Neurovascular WDL    Peripheral Neurovascular WDL WDL        Cognitive/Neuro/Behavioral WDL    Cognitive/Neuro/Behavioral WDL X     Level of Consciousness confused

## 2024-03-27 NOTE — ED PROVIDER NOTES
"  History     Chief Complaint:  Fall    The history is provided by the patient. The history is limited by the condition of the patient.      Jacob Boland is a 89 year old male with history of dementia, COPD, hyperlipidemia, and idiopathic progressive neuropathy presenting via EMS for evaluation after a fall. Jacob explains that he fell out of bed this morning and is now experiencing neck pain. He denies other pain. He denies nausea.    Independent Historian:   None - Patient Only    Review of External Notes:   I reviewed the 3/13/24 ED note for confusion and dementia. I reviewed the paperwork from his facility regarding history; he is DNR/DNI.    Medications:    Albuterol  Celexa  Myrbetriq  Prilosec  Flomax  Zoloft  Cardura  Cymbalta    Past Medical History:    Anxiety  Chronic osteoarthritis  COPD  Depressive disorder  GERD  Moderate dementia without behavioral disturbance  Anemia  Asbestosis  BPH  Colonic polyps  Hyperlipidemia  Idiopathic progressive neuropathy  Inguinal hernia  Lumbosacral spondylosis with radiculopathy  Nontoxic uninodular goiter  Onychomycosis  Sensorineural hearing loss, bilateral    Past Surgical History:    Joint replacement, hip RT/LT, right  Lumbar laminectomy  Unlisted procedure humerus/elbow  Colonoscopy diagnostic  U/S aorta DEBI  Biopsy prostate  Hernia surgery  Colonoscopy screening    Physical Exam   Patient Vitals for the past 24 hrs:   BP Temp Temp src Pulse Resp SpO2 Height Weight   03/27/24 0840 -- -- -- -- -- -- 1.753 m (5' 9\") 79.8 kg (175 lb 14.8 oz)   03/27/24 0834 134/64 97.5  F (36.4  C) Oral 57 16 96 % -- --     Physical Exam  General: The patient is alert, in no respiratory distress.    HENT: Mucous membranes moist. Not in a C-collar but has towel wrapped around his neck    Cardiovascular: Regular rate and rhythm. Good pulses in all four extremities. Normal capillary refill and skin turgor.     Respiratory: Lungs are clear. No nasal flaring. No retractions. No " wheezing, no crackles.    Gastrointestinal: Abdomen soft. No guarding, no rebound. No palpable hernias.     Musculoskeletal: No gross deformity.     Skin: No rashes or petechiae.     Neurologic: The patient is alert. GCS 15. No testable cranial nerve deficit. Follows commands with clear and appropriate speech. Gives appropriate answers. Good strength in all extremities. Gross sensation intact. Pupils are round and reactive. No meningismus.     Lymphatic: No cervical adenopathy. No lower extremity swelling.    Psychiatric: The patient is non-tearful.    Emergency Department Course   Imaging:  XR Pelvis 1/2 Views   Final Result   IMPRESSION: Postoperative changes right total hip arthroplasty.   Components appear well seated. Severe degenerative change left hip   joint with bone on bone contact superiorly. No fracture or dislocation   on this single projection. Pelvis negative for fracture.      EMMA COE MD            SYSTEM ID:  [id number]      XR Tibia and Fibula Left 2 Views   Final Result   IMPRESSION: The left tibia and fibula are negative. Soft tissue   swelling about the ankle.      EMMA COE MD            SYSTEM ID:  [id number]      CT Cervical Spine w/o Contrast   Final Result   IMPRESSION:   1. No acute fracture or posttraumatic subluxation.   2. Multilevel degenerative changes as detailed above.       JUSTIN SIMEON MD            SYSTEM ID:  [id number]      CT Head w/o Contrast   Final Result   IMPRESSION:    1. No acute intracranial pathology.    2. Chronic small vessel ischemic disease and diffuse cerebral volume   loss.      JUSTIN SIMEON MD            SYSTEM ID:  [id number]        Emergency Department Course & Assessments:    Interventions:  Medications - No data to display     Assessments:  0855 I obtained history and examined the patient as noted above.  1105 I reassessed the patient he is eating we discussed results and he is currently comfortable he will be discharged back to his  facility.    Independent Interpretation (X-rays, CTs, rhythm strip):  I reviewed the x-ray of the pelvis and the left tib-fib.  While the fibula appears to have an old injury there is otherwise no fracture nor dislocation         Social Determinants of Health affecting care:   Healthcare access    Disposition:  The patient was discharged.    Impression & Plan    MIPS (If applicable):  N/A    Medical Decision Making:  The history is limited the patient had fallen out of bed he did not have any complaints of serious pain I did carefully assess him and do not see signs of overt bruising dislocation.  I did image his pelvis he has had some previous problems with his left leg I did consider could be an occult fracture there his head and neck were imaged and were reassuring for no signs of acute trauma.  While there was signs of old injury to his left fibula there was nothing new the patient is eating comfortable and was discharged back to his facility in good condition.    Diagnosis:    ICD-10-CM    1. Fall, initial encounter  W19.XXXA         Discharge Medications:  New Prescriptions    No medications on file     Scribe Disclosure:  I, Daron Castillo, am serving as a scribe at 8:55 AM on 3/27/2024 to document services personally performed by Perfecto Salgado MD based on my observations and the provider's statements to me.   3/27/2024   Perfecto Salgado MD Farnan, Christopher M, MD  03/27/24 4063

## 2024-03-29 NOTE — LETTER
"    3/29/2024        RE: Jacob Boland  [address] [facility] [address]        [facility] GERIATRICS    Chief Complaint   Patient presents with     Hospital F/U     HPI:  Jacob Boland is a 89 year old  (1/11/1933), who is being seen today for an episodic care visit at: [facility]  () [[id number]].     Today's concern is: Seen today for hospital follow-up, in ED 3/27 after a fall.workup including xray of the pelvis, left tib/fib, CT of the spine and head was negative for acute findings. He was discharged back to [facility] where he resides permanently.     Seen today for follow-up in his room in LT. Nursing report patient was vomiting continuously over night with diarrhea. He is resting abed and appears frail but comfortable. He awakens to voice and reports he is feeling \"better.\" He is repetitive and does not provide any meaningful history. He is denying CP, SOB, nausea, fever/chills, dizziness or light headedness.    Allergies, and PMH/PSH reviewed in [facility] today.  REVIEW OF SYSTEMS:  Limited secondary to cognitive impairment but today pt reports the above    Objective:   BP (!) 89/61   Pulse 78   Temp 97.6  F (36.4  C)   Resp 18   Ht 1.753 m (5' 9\")   Wt 82.1 kg (181 lb)   SpO2 92%   BMI 26.73 kg/m    GENERAL APPEARANCE:  Alert, in no distress, frail appearing  RESP:  respiratory effort and palpation of chest normal, lungs clear to auscultation , no respiratory distress  CV:  Palpation and auscultation of heart done , regular rate and rhythm, no murmur, rub, or gallop, no edema  ABDOMEN:  hyperactive bowel sounds, soft, nontender, no hepatosplenomegaly or other masses  M/S:   Gait and station abnormal transfers with assist, wheelchair for mobility  SKIN:  Inspection of skin and subcutaneous tissue baseline, Palpation of skin and subcutaneous tissue baseline  NEURO:   deal freely, follows commands  PSYCH:  insight and judgement impaired, memory impaired     Labs done in SNF are " in [facility]. Please refer to them using EPIC/Care Everywhere. and Recent labs in EPIC reviewed by me today.     Assessment/Plan:  (K52.9) Gastroenteritis  (primary encounter diagnosis)  (I95.89,  E86.1) Hypotension due to hypovolemia  (W19.XXXD) Fall, subsequent encounter  Comment: acute gastroenteritis, stool returned today positive for norovirus. No recent cases in the facility, may have been exposed in his ED visit for fall, trauma workup negative. He is mildly hypotensive, suspect dry after frequent emesis and diarrhea over night. He is afebrile. GI symptoms have subsided and he is tolerating PO intake, requested nursing push oral fluids, will check CBC, BMP tomorrow.  -management reviewed with nursing facility staff today  Plan: CBC, BMP 4/1. Push oral fluids. Enteric precautions per facility protocol. Continue fall precuations, oral fluids, supprotive measures. Tylenol TID and PRN. VS q shift with provider notification parameters. Zofran 4 mg Q6H PRN.       MED REC REQUIRED  Post Medication Reconciliation Status: discharge medications reconciled and changed, per note/orders    ly signed by: SHERI Wong CNP         Sincerely,        SHERI Wong CNP

## 2024-03-29 NOTE — PROGRESS NOTES
"[facility] GERIATRICS    Chief Complaint   Patient presents with    Hospital F/U     HPI:  Jacob Boland is a 89 year old  (1/11/1933), who is being seen today for an episodic care visit at: [facility]  () [[id number]].     Today's concern is: Seen today for hospital follow-up, in ED 3/27 after a fall.workup including xray of the pelvis, left tib/fib, CT of the spine and head was negative for acute findings. He was discharged back to [facility] where he resides permanently.     Seen today for follow-up in his room in LT. Nursing report patient was vomiting continuously over night with diarrhea. He is resting abed and appears frail but comfortable. He awakens to voice and reports he is feeling \"better.\" He is repetitive and does not provide any meaningful history. He is denying CP, SOB, nausea, fever/chills, dizziness or light headedness.    Allergies, and PMH/PSH reviewed in EP[facility] today.  REVIEW OF SYSTEMS:  Limited secondary to cognitive impairment but today pt reports the above    Objective:   BP (!) 89/61   Pulse 78   Temp 97.6  F (36.4  C)   Resp 18   Ht 1.753 m (5' 9\")   Wt 82.1 kg (181 lb)   SpO2 92%   BMI 26.73 kg/m    GENERAL APPEARANCE:  Alert, in no distress, frail appearing  RESP:  respiratory effort and palpation of chest normal, lungs clear to auscultation , no respiratory distress  CV:  Palpation and auscultation of heart done , regular rate and rhythm, no murmur, rub, or gallop, no edema  ABDOMEN:  hyperactive bowel sounds, soft, nontender, no hepatosplenomegaly or other masses  M/S:   Gait and station abnormal transfers with assist, wheelchair for mobility  SKIN:  Inspection of skin and subcutaneous tissue baseline, Palpation of skin and subcutaneous tissue baseline  NEURO:   deal freely, follows commands  PSYCH:  insight and judgement impaired, memory impaired     Labs done in SNF are in [facility]. Please refer to them using [employer]/Care Everywhere. and Recent labs in [facility] " reviewed by me today.     Assessment/Plan:  (K52.9) Gastroenteritis  (primary encounter diagnosis)  (I95.89,  E86.1) Hypotension due to hypovolemia  (W19.XXXD) Fall, subsequent encounter  Comment: acute gastroenteritis, stool returned today positive for norovirus. No recent cases in the facility, may have been exposed in his ED visit for fall, trauma workup negative. He is mildly hypotensive, suspect dry after frequent emesis and diarrhea over night. He is afebrile. GI symptoms have subsided and he is tolerating PO intake, requested nursing push oral fluids, will check CBC, BMP tomorrow.  -management reviewed with nursing facility staff today  Plan: CBC, BMP 4/1. Push oral fluids. Enteric precautions per facility protocol. Continue fall precuations, oral fluids, supprotive measures. Tylenol TID and PRN. VS q shift with provider notification parameters. Zofran 4 mg Q6H PRN.       MED REC REQUIRED  Post Medication Reconciliation Status: discharge medications reconciled and changed, per note/orders    ly signed by: SHERI Wong CNP

## 2024-04-04 NOTE — PROGRESS NOTES
"[facility] GERIATRICS    Chief Complaint   Patient presents with    Nursing Home Acute     HPI:  Jacob Boland is a 89 year old  (1/11/1933), who is being seen today for an episodic care visit at: [facility]  () [[id number]]. Today's concern is: Seen today for follow-up on recent norovirus infection, sypmtoms began 3/28 overnight and stool positive 3/28. Received supportive measures, zofran. Per nursing patient has since improved, up to wheelchair at his baseline and eager to get off of isolation precautions. Seen today in his room resting abed. He reports he is feeling much better, smiles throughout the visit. He does have a headache and feels he is probably not drinking enough fluids. It is a dull ache and seems to come and go, feels a bit better lying down. He readily drinks about 60 ml with assist. He is denying CP, SOB, ongoing NVD, fever/chills, dizziness or light headedness.    Allergies, and PMH/PSH reviewed in [facility] today.  REVIEW OF SYSTEMS:  Limited secondary to cognitive impairment but today pt reports the above    Objective:   /73   Pulse 84   Temp 98.1  F (36.7  C)   Resp 18   Ht 1.753 m (5' 9\")   Wt 82.1 kg (181 lb)   SpO2 99%   BMI 26.73 kg/m    GENERAL APPEARANCE:  Alert, in no distress  RESP:  respiratory effort and palpation of chest normal, lungs clear to auscultation , no respiratory distress  CV:  Palpation and auscultation of heart done , regular rate and rhythm, no murmur, rub, or gallop, no edema  ABDOMEN:  normal bowel sounds, soft, nontender, no hepatosplenomegaly or other masses  M/S:   Gait and station abnormal transfers with assist, wheelchair for mobility  SKIN:  Inspection of skin and subcutaneous tissue baseline, Palpation of skin and subcutaneous tissue baseline  NEURO:   deal freely, head atraumatic, follows commands  PSYCH:  memory impaired , affect and mood normal, oriented to self and situation    Labs done in SNF are in [facility]. Please " refer to them using EPIC/Care Everywhere. and Recent labs in EPIC reviewed by me today.     Assessment/Plan:  (A08.11) Norovirus  (primary encounter diagnosis)  (R51.9) Acute nonintractable headache, unspecified headache type  (D72.829) Leukocytosis, unspecified type  Comment: acute norovirus infection, symptoms resolving. Suspect headache is related to mild dehydration, he is drinking readily and encouraged fluids. Discussed with nursing facility staff to continue the same, will recheck CBC and BMP tomorrow 4/5.   Plan: CBC, BMP 4/5. Continue to push fluids Q2HWA, VS monitoring, zofran PRN, tylenol TID.       Electronically signed by: SHERI Wong CNP

## 2024-04-04 NOTE — LETTER
"    4/4/2024        RE: Jacob Boland  [address]  [facility] [address]        [facility] GERIATRICS    Chief Complaint   Patient presents with     Nursing Home Acute     HPI:  Jacob Boland is a 89 year old  (1/11/1933), who is being seen today for an episodic care visit at: [facility]  () [[id number]]. Today's concern is: Seen today for follow-up on recent norovirus infection, sypmtoms began 3/28 overnight and stool positive 3/28. Received supportive measures, zofran. Per nursing patient has since improved, up to wheelchair at his baseline and eager to get off of isolation precautions. Seen today in his room resting abed. He reports he is feeling much better, smiles throughout the visit. He does have a headache and feels he is probably not drinking enough fluids. It is a dull ache and seems to come and go, feels a bit better lying down. He readily drinks about 60 ml with assist. He is denying CP, SOB, ongoing NVD, fever/chills, dizziness or light headedness.    Allergies, and PMH/PSH reviewed in [facility] today.  REVIEW OF SYSTEMS:  Limited secondary to cognitive impairment but today pt reports the above    Objective:   /73   Pulse 84   Temp 98.1  F (36.7  C)   Resp 18   Ht 1.753 m (5' 9\")   Wt 82.1 kg (181 lb)   SpO2 99%   BMI 26.73 kg/m    GENERAL APPEARANCE:  Alert, in no distress  RESP:  respiratory effort and palpation of chest normal, lungs clear to auscultation , no respiratory distress  CV:  Palpation and auscultation of heart done , regular rate and rhythm, no murmur, rub, or gallop, no edema  ABDOMEN:  normal bowel sounds, soft, nontender, no hepatosplenomegaly or other masses  M/S:   Gait and station abnormal transfers with assist, wheelchair for mobility  SKIN:  Inspection of skin and subcutaneous tissue baseline, Palpation of skin and subcutaneous tissue baseline  NEURO:   deal freely, head atraumatic, follows commands  PSYCH:  memory impaired , affect and " mood normal, oriented to self and situation    Labs done in SNF are in [place]. Please refer to them using EPIC/Care Everywhere. and Recent labs in [facility] reviewed by me today.     Assessment/Plan:  (A08.11) Norovirus  (primary encounter diagnosis)  (R51.9) Acute nonintractable headache, unspecified headache type  (D72.829) Leukocytosis, unspecified type  Comment: acute norovirus infection, symptoms resolving. Suspect headache is related to mild dehydration, he is drinking readily and encouraged fluids. Discussed with nursing facility staff to continue the same, will recheck CBC and BMP tomorrow 4/5.   Plan: CBC, BMP 4/5. Continue to push fluids Q2HWA, VS monitoring, zofran PRN, tylenol TID.       Electronically signed by: SHERI Wong CNP         Sincerely,        SHERI Wong CNP

## 2024-04-09 NOTE — ED TRIAGE NOTES
Pt BIBA from [facility] following an unwitnessed fall. No paperwork provided from facility. Per EMS pt is not ambulatory at baseline, but this morning attempted to get out of bed on their own and fell. When staff initially found him, pt did not complain of any pain but began complaining of neck and back pain. Here pt states no new pain since fall but chronic pain present besides complaining the collar is uncomfortable. Not believed to be on blood thinners.      Triage Assessment (Adult)       Row Name 04/09/24 0654          Triage Assessment    Airway WDL WDL        Respiratory WDL    Respiratory WDL WDL        Skin Circulation/Temperature WDL    Skin Circulation/Temperature WDL WDL        Cardiac WDL    Cardiac WDL WDL        Peripheral/Neurovascular WDL    Peripheral Neurovascular WDL WDL        Cognitive/Neuro/Behavioral WDL    Cognitive/Neuro/Behavioral WDL WDL  known dementia

## 2024-04-09 NOTE — ED PROVIDER NOTES
History     Chief Complaint:  Fall       The history is provided by the patient. The history is limited by the condition of the patient.      Jacob Boland is a 89 year old male with dementia presenting alone via EMS after an unwitnessed fall.  Jacob tells me he was trying to get out of bed because he was left alone in his room and this caused him to fall.  He complains of left hip and knee pain as well as headache and neck pain.  He denies other concerns but is an unreliable historian.    Independent Historian:    As above    Review of External Notes:  ED visit 3/27/24 for fall.  Nursing home visit 4/4/2024 noting the patient tested positive for norovirus 3/28/2024. I also reviewed paperwork provided by Jaya.    Medications:    acetaminophen (TYLENOL) 500 MG tablet  albuterol (PROAIR HFA/PROVENTIL HFA/VENTOLIN HFA) 108 (90 Base) MCG/ACT inhaler  calcium carbonate (TUMS) 500 MG chewable tablet  citalopram (CELEXA) 10 MG tablet  diclofenac (VOLTAREN) 1 % topical gel  Lidocaine (LIDOCARE) 4 % Patch  melatonin 3 MG tablet  mirabegron (MYRBETRIQ) 25 MG 24 hr tablet  Multiple Vitamin (MULTIVITAMIN ADULT) TABS  omeprazole (PRILOSEC) 20 MG DR capsule  ondansetron (ZOFRAN) 4 MG tablet  polyethylene glycol 3350 POWD  polyvinyl alcohol (LIQUIFILM TEARS) 1.4 % ophthalmic solution  senna (SENOKOT) 8.6 MG tablet  tamsulosin (FLOMAX) 0.4 MG capsule  vitamin B-12 (CYANOCOBALAMIN) 1000 MCG tablet  Vitamin D3 (CHOLECALCIFEROL) 25 mcg (1000 units) tablet    Past Medical History:    Anxiety  Chronic osteoarthritis  COPD  Depressive disorder  GERD  Moderate dementia without behavioral disturbance  Anemia  Asbestosis  BPH  Colonic polyps  Hyperlipidemia  Idiopathic progressive neuropathy  Inguinal hernia  Lumbosacral spondylosis with radiculopathy  Nontoxic uninodular goiter  Onychomycosis  Sensorineural hearing loss, bilateral    Past Surgical History:    Past Surgical History:   Procedure Laterality Date    JOINT  "REPLACEMENT, HIP RT/LT Right     LUMBAR LAMINECTOMY  2006      Physical Exam   Patient Vitals for the past 24 hrs:   BP Temp Temp src Pulse Resp SpO2 Weight   04/09/24 1000 -- -- -- 53 24 -- --   04/09/24 0945 97/73 -- -- 50 13 96 % --   04/09/24 0915 -- -- -- -- -- -- 82.1 kg (181 lb)   04/09/24 0651 133/62 97.5  F (36.4  C) Oral 55 18 96 % 82.1 kg (181 lb)        Physical Exam  General: Well-developed and well-nourished. Well appearing elderly  man. Cooperative.  Head:  Atraumatic.  Eyes:  Conjunctivae, lids, and sclerae are normal.  ENT:    Normal nose. Moist mucous membranes.  Neck:  Cervical collar in place.  CV:  Bradycardic rate and regular rhythm. Normal heart sounds with no murmurs, rubs, or gallops detected.  Resp:  No respiratory distress. Clear to auscultation bilaterally without decreased breath sounds, wheezing, rales, or rhonchi.  GI:  Soft. Non-distended. Non-tender.    MS:  Laying on his right hip with ill-defined tenderness primarily in the left thigh without obvious deformity.  No tenderness to palpation throughout the remainder of the extremities.  Skin:  Warm. Non-diaphoretic. No pallor.  Dressing on the left lower leg clean, dry, and intact.  Neuro:  Awake.  Alert.  Oriented to self.  Disoriented to year (\"11\"). Normal strength.  Psych: Normal mood and affect. Normal speech.  Vitals reviewed.    Emergency Department Course   EKG  Indication: unwitnessed fall  Time: 0821  Rate 52 bpm. MI interval 162. QRS duration 90. QT/QTc 442/411.   Sinus bradycardia  Otherwise normal ECG  No acute ST changes.  No prior for comparison.      Imaging:  XR Femur Left 2 Views   Final Result   IMPRESSION:    Pelvis: No acute fracture or dislocation. Severe end-stage arthropathy   left hip joint stable. Visualized right hip arthroplasty stable.   Degenerative changes lumbar spine. Diffuse bony embolization.      Femur: No acute fracture.       Knee: No acute fracture or dislocation. Diffuse bony " demineralization.   Mild degenerative arthritis and minimal chondrocalcinosis. No   effusion.      KENNEDY NICHOLSON MD            SYSTEM ID:  [id number]      XR Knee Left 3 Views   Final Result   IMPRESSION:    Pelvis: No acute fracture or dislocation. Severe end-stage arthropathy   left hip joint stable. Visualized right hip arthroplasty stable.   Degenerative changes lumbar spine. Diffuse bony embolization.      Femur: No acute fracture.       Knee: No acute fracture or dislocation. Diffuse bony demineralization.   Mild degenerative arthritis and minimal chondrocalcinosis. No   effusion.      KENNEDY NICHOLSON MD            SYSTEM ID:  [id number]      XR Pelvis 1/2 Views   Final Result   IMPRESSION:    Pelvis: No acute fracture or dislocation. Severe end-stage arthropathy   left hip joint stable. Visualized right hip arthroplasty stable.   Degenerative changes lumbar spine. Diffuse bony embolization.      Femur: No acute fracture.       Knee: No acute fracture or dislocation. Diffuse bony demineralization.   Mild degenerative arthritis and minimal chondrocalcinosis. No   effusion.      KENNEDY NICHOLSON MD            SYSTEM ID:  [id number]      CT Head w/o Contrast   Final Result   IMPRESSION: Diffuse cerebral volume loss and cerebral white matter   changes consistent with chronic small vessel ischemic disease. No   evidence for acute intracranial pathology.            JOAN NICHOLAS MD            SYSTEM ID:  [id number]      CT Cervical Spine w/o Contrast   Final Result   IMPRESSION: There is mild degenerative retrolisthesis of C3 upon C4.   There is minimal degenerative anterolisthesis of C6 upon C7 and C7 on   T1. Alignment of the cervical vertebrae is otherwise normal. Vertebral   body heights of the cervical spine are normal. Craniocervical   alignment is normal. There are no fractures of the cervical spine.   There is degenerative endplate spurring and facet arthropathy to   varying degrees throughout the cervical  spine. No spinal canal   stenosis. No prevertebral soft tissue swelling.         JOAN NICHOLAS MD            SYSTEM ID:  [id number]        Emergency Department Course & Assessments:    Interventions:  Medications   acetaminophen (TYLENOL) tablet 1,000 mg (1,000 mg Oral $Given 4/9/24 9174)      Assessments:  0845 I reassessed the patient and removed the cervical collar.   0902 I updated patient on plan for discharge.  He has no questions.    Independent Interpretation (X-rays, CTs, rhythm strip):  I independently interpreted the head CT and see no intracranial hemorrhage.    Consultations/Discussion of Management or Tests:  Not applicable    Social Determinants of Health affecting care:  Lives in a care facility.     Disposition:  The patient was discharged.    Impression & Plan    Medical Decision Making:  Jacob is a 89 year old man with dementia who had an unwitnessed fall.  He reports this is because he was trying to get out of his bed alone.  He describes left hip and knee pain as well as headache and neck pain but his history is unreliable.  He is well-appearing on exam without obvious bony tenderness or deformity.  I appreciate no acute appearing trauma.  EKG is reassuring without acute ST changes or arrhythmias.  I doubt a cardiac etiology for this fall. Serum studies are unlikely to  as there is no report of illness or symptoms. X-ray of the left femur, knee, and hip/pelvis are without fracture or dislocation although severe arthritis is noted.  CT of the head is without intracranial hemorrhage or skull fracture.  CT of the cervical spine reveals some degenerative changes without acute fracture or dislocation.  His cervical collar was removed and he had no further complaints.  He was given Tylenol but required no further interventions.  He is appropriate for discharge back to his care facility.  He should return if he has recurrent fall or new symptoms and otherwise follow-up with primary  care.    Diagnosis:    ICD-10-CM    1. Unwitnessed fall  R29.6            Discharge Medications:  Discharge Medication List as of 4/9/2024 10:10 AM         4/9/2024   Adriana Bean MD Dixson, Kylie S, MD  04/15/24 1533

## 2024-04-09 NOTE — DISCHARGE INSTRUCTIONS
Continue home medications.  Tylenol as needed for pain.  Return with worsening symptoms or new concerns of any kind.  Follow-up with primary care.

## 2024-04-09 NOTE — TELEPHONE ENCOUNTER
Staff called to report that resident fell with pain in neck and back.  They already called 911 and had him set over to the ED.    Thanked them for the call.    SHERI Leonardo CNP

## 2024-04-30 NOTE — PATIENT INSTRUCTIONS
Ronaldo Boland  1/11/1933     Remeron 7.5 mg at bedtime dx: insomnia, depression/anxiety      SHERI Wong CNP on 5/1/2024 at 10:39 AM

## 2024-04-30 NOTE — PROGRESS NOTES
"[facility] GERIATRICS    Chief Complaint   Patient presents with    RECHECK     HPI:  Jacob Boland is a 89 year old  (1/11/1933), who is being seen today for an episodic care visit at: [facility]  () [[id number]]. Today's concern is: Seen today for follow-up at nursing request to review medications per family request. Per nursing, family is hoping to eliminate any unnecessary meds with comfort focus and wondering about any less expensive alternatives. Seen today up on the unit in LTC. He is arguing with staff and asking questions repetitively and seemingly does not recall having these answered. He becomes increasingly frustrated with staff despite having his questions answered. He reports he is frustrated and also having trouble sleeping. Usually can fall asleep but has trouble staying asleep and wakes very early. He gets up to his wheelchair but then has nothing to do. However he feels he is doing \"mostly okay.\" He denies pain this morning. He is denying CP, SOB, changes in b/b habits, fever/chills.    Allergies, and PMH/PSH reviewed in EPIC today.  REVIEW OF SYSTEMS:  Limited secondary to cognitive impairment but today pt reports the above and 4 point ROS including Respiratory, CV, GI and , other than that noted in the HPI,  is negative    Objective:   /72   Pulse 74   Temp 98.2  F (36.8  C)   Resp 18   Ht 1.753 m (5' 9\")   Wt 78.5 kg (173 lb)   SpO2 95%   BMI 25.55 kg/m    GENERAL APPEARANCE:  Alert, in no distress  RESP:  respiratory effort and palpation of chest normal, lungs clear to auscultation , no respiratory distress  CV:  Palpation and auscultation of heart done , regular rate and rhythm, no murmur, rub, or gallop, no edema  ABDOMEN:  normal bowel sounds, soft, nontender, no hepatosplenomegaly or other masses  M/S:   Gait and station abnormal transfers with assist, wheelchair for mobility  SKIN:  Inspection of skin and subcutaneous tissue baseline, Palpation of " skin and subcutaneous tissue baseline  NEURO:   toyin, follows simple commands  PSYCH:  memory impaired , STML, irritable    Labs done in SNF are in [place] EPIC. Please refer to them using EPIC/Care Everywhere. and Recent labs in EPIC reviewed by me today.     Assessment/Plan:  (F33.9) Recurrent major depressive disorder, remission status unspecified (H24)  (primary encounter diagnosis)  (G47.00) Insomnia, unspecified type  (F03.B4) Moderate dementia with anxiety, unspecified dementia type (H)  Comment: chronic, with acute agitation. Irritable and easily frustrated, exacerbated by STML. He has low functional status, increasingly dependent for cares. Previously discussed remeron with family and plan was to start this but on med rec does not appear orders were transcribed. Reviewed psychiatry notes from 3/28 and left a message with psychiatry to discuss management, recommending we start remeron 7.5 mg at bedtime and will plan to do this pending psychiatry input. He has also had weight loss for which remeron may be beneficial.  Plan: continue citalopram 20 mg daily, start remeron 7.5 mg at bedtime pending psychiatry input, LTC for assist with ADLs, medication management, meals, activities as needed.      (M25.561,  M25.562,  G89.29) Chronic pain of both knees  Comment: chronic, ongoing. Fairly controlled today, will trial trolamine in exchange for voltaren at family request for possible cost savings  Plan: trolamine salicylate (ASPERCREME) 10 % external        Cream BID and BID PRN, disoncintue voltaren, continue tylenol TID    (N40.0) Benign prostatic hyperplasia, unspecified whether lower urinary tract symptoms present  Comment: chronic, ongoing. Previously on mirabegron, unclear if any benefit and he has ongoing urinary frequency, will try stopping this considering risks/benefigts  Plan: discontinue mirabegron, continue tamsulosin, monitor symptoms    (Z79.899) Polypharmacy  Comment: chronic, per family looking to  eliminate unnecessary medications.   Plan: discontinue b12, mirabegron, voltaren.          MED REC REQUIRED  Post Medication Reconciliation Status: patient was not discharged from an inpatient facility or TCU      Orders:  Discontinue mirabegron, b12, voltaren  Trolamine 10% cream apply topically to knees BID and BID PRN    Electronically signed by: Tomeka Lazo CNA

## 2024-04-30 NOTE — LETTER
"    4/30/2024        RE: Jacob Boland  [address] [address]        [facility] GERIATRICS    Chief Complaint   Patient presents with     RECHECK     HPI:  Jacob Boland is a 89 year old  (1/11/1933), who is being seen today for an episodic care visit at: [facility]  () [[id number]]. Today's concern is: Seen today for follow-up at nursing request to review medications per family request. Per nursing, family is hoping to eliminate any unnecessary meds with comfort focus and wondering about any less expensive alternatives. Seen today up on the unit in LTC. He is arguing with staff and asking questions repetitively and seemingly does not recall having these answered. He becomes increasingly frustrated with staff despite having his questions answered. He reports he is frustrated and also having trouble sleeping. Usually can fall asleep but has trouble staying asleep and wakes very early. He gets up to his wheelchair but then has nothing to do. However he feels he is doing \"mostly okay.\" He denies pain this morning. He is denying CP, SOB, changes in b/b habits, fever/chills.    Allergies, and PMH/PSH reviewed in [facility] today.  REVIEW OF SYSTEMS:  Limited secondary to cognitive impairment but today pt reports the above and 4 point ROS including Respiratory, CV, GI and , other than that noted in the HPI,  is negative    Objective:   /72   Pulse 74   Temp 98.2  F (36.8  C)   Resp 18   Ht 1.753 m (5' 9\")   Wt 78.5 kg (173 lb)   SpO2 95%   BMI 25.55 kg/m    GENERAL APPEARANCE:  Alert, in no distress  RESP:  respiratory effort and palpation of chest normal, lungs clear to auscultation , no respiratory distress  CV:  Palpation and auscultation of heart done , regular rate and rhythm, no murmur, rub, or gallop, no edema  ABDOMEN:  normal bowel sounds, soft, nontender, no hepatosplenomegaly or other masses  M/S:   Gait and station abnormal transfers with assist, " wheelchair for mobility  SKIN:  Inspection of skin and subcutaneous tissue baseline, Palpation of skin and subcutaneous tissue baseline  NEURO:   deal, follows simple commands  PSYCH:  memory impaired , STML, irritable    Labs done in SNF are in [facility]. Please refer to them using EPIC/Care Everywhere. and Recent labs in [facility] reviewed by me today.     Assessment/Plan:  (F33.9) Recurrent major depressive disorder, remission status unspecified (H24)  (primary encounter diagnosis)  (G47.00) Insomnia, unspecified type  (F03.B4) Moderate dementia with anxiety, unspecified dementia type (H)  Comment: chronic, with acute agitation. Irritable and easily frustrated, exacerbated by STML. He has low functional status, increasingly dependent for cares. Previously discussed remeron with family and plan was to start this but on med rec does not appear orders were transcribed. Reviewed psychiatry notes from 3/28 and left a message with psychiatry to discuss management, recommending we start remeron 7.5 mg at bedtime and will plan to do this pending psychiatry input. He has also had weight loss for which remeron may be beneficial.  Plan: continue citalopram 20 mg daily, start remeron 7.5 mg at bedtime pending psychiatry input, LTC for assist with ADLs, medication management, meals, activities as needed.      (M25.561,  M25.562,  G89.29) Chronic pain of both knees  Comment: chronic, ongoing. Fairly controlled today, will trial trolamine in exchange for voltaren at family request for possible cost savings  Plan: trolamine salicylate (ASPERCREME) 10 % external        Cream BID and BID PRN, disoncintue voltaren, continue tylenol TID    (N40.0) Benign prostatic hyperplasia, unspecified whether lower urinary tract symptoms present  Comment: chronic, ongoing. Previously on mirabegron, unclear if any benefit and he has ongoing urinary frequency, will try stopping this considering risks/benefigts  Plan: discontinue mirabegron, continue  tamsulosin, monitor symptoms    (Z79.899) Polypharmacy  Comment: chronic, per family looking to eliminate unnecessary medications.   Plan: discontinue b12, mirabegron, voltaren.          MED REC REQUIRED  Post Medication Reconciliation Status: patient was not discharged from an inpatient facility or TCU      Orders:  Discontinue mirabegron, b12, voltaren  Trolamine 10% cream apply topically to knees BID and BID PRN    Electronically signed by: Tomeka Lazo CNA         Sincerely,        SHERI Wong CNP
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
## 2024-05-29 NOTE — PROGRESS NOTES
"[facility] GERIATRICS    Chief Complaint   Patient presents with    Nursing Home Acute     HPI:  Jacob Boland is a 89 year old  (1/11/1933), who is being seen today for an episodic care visit at: [facility]  () [[id number]]. Today's concern is: Seen today at nursing request for left wrist edema/pain. Received morphine earlier this morning with relief. Seen today up on the unit in LTC, patient's sister in law Nika is present and helps augment some history. Patient is hard of hearing and irritable but endorses wrist pain and wondering about an xray. Per Nika, she touched his wrist t his morning, and he jumped in pain. Patient reports wrist pain is ongoing x 4 month, history limited due to dementia.    Allergies, and PMH/PSH reviewed in EPIC today.  REVIEW OF SYSTEMS:  Limited secondary to cognitive impairment but today pt reports the above    Objective:   /68   Pulse 78   Temp 97.9  F (36.6  C)   Resp 18   Ht 1.753 m (5' 9\")   Wt 77.7 kg (171 lb 3.2 oz)   SpO2 94%   BMI 25.28 kg/m    GENERAL APPEARANCE:  Alert, anxious  RESP:  respiratory effort and palpation of chest normal, lungs clear to auscultation , no respiratory distress  CV:  regular rate and rhythm, no murmur, rub, or gallop, no edema  M/S:   Gait and station abnormal transfers with assist, wheelchair. Left wrist tender, mobility limited 2/2 pain, grossly enlarged, minimal soft tissue edema, mildly warm to touch  SKIN:  no redness to left wrist  NEURO:   deal freely  PSYCH:  memory impaired , irritable    Labs done in SNF are in [facility]. Please refer to them using EPIC/Care Everywhere. and Recent labs in [facility] reviewed by me today.     Assessment/Plan:  (M25.532,  M25.432) Pain and swelling of left wrist  (primary encounter diagnosis)  (Z51.5) Hospice care patient  Comment: acute left wrist pain and swelling, onset this morning. History of extensive falls, none recently by report, though he continues to be " impulsive and reasonably could be injured. Wrist is slightly warm to touch which could be from a number of inflammatory causes, will limit workup considering goals of care but feel Xray is reasonable, consider prednisone if no acute findings.   >patient's sister in law utilized as an independent historian due to dementia  Plan: Treat to comfort per hospice, MS available. Xray 2 view left wrist.  If negative consider prednisone burst.     MED REC REQUIRED  Post Medication Reconciliation Status: patient was not discharged from an inpatient facility or TCU      Orders:  Xray 2 view left wrist      Electronically signed by: SHERI Wong CNP

## 2024-05-29 NOTE — LETTER
"    5/29/2024        RE: Jacob Boland  [address] [address] [address]        [facility] GERIATRICS    Chief Complaint   Patient presents with     Nursing Home Acute     HPI:  Jacob Boland is a 89 year old  (1/11/1933), who is being seen today for an episodic care visit at: [facility]  () [[id number]]. Today's concern is: Seen today at nursing request for left wrist edema/pain. Received morphine earlier this morning with relief. Seen today up on the unit in LTC, patient's sister in law Maher is present and helps augment some history. Patient is hard of hearing and irritable but endorses wrist pain and wondering about an xray. Per Nika, she touched his wrist t his morning, and he jumped in pain. Patient reports wrist pain is ongoing x 4 month, history limited due to dementia.    Allergies, and PMH/PSH reviewed in EPIC today.  REVIEW OF SYSTEMS:  Limited secondary to cognitive impairment but today pt reports the above    Objective:   /68   Pulse 78   Temp 97.9  F (36.6  C)   Resp 18   Ht 1.753 m (5' 9\")   Wt 77.7 kg (171 lb 3.2 oz)   SpO2 94%   BMI 25.28 kg/m    GENERAL APPEARANCE:  Alert, anxious  RESP:  respiratory effort and palpation of chest normal, lungs clear to auscultation , no respiratory distress  CV:  regular rate and rhythm, no murmur, rub, or gallop, no edema  M/S:   Gait and station abnormal transfers with assist, wheelchair. Left wrist tender, mobility limited 2/2 pain, grossly enlarged, minimal soft tissue edema, mildly warm to touch  SKIN:  no redness to left wrist  NEURO:   deal freely  PSYCH:  memory impaired , irritable    Labs done in SNF are in [facility]. Please refer to them using [employer]/Care Everywhere. and Recent labs in EPIC reviewed by me today.     Assessment/Plan:  (M25.532,  M25.432) Pain and swelling of left wrist  (primary encounter diagnosis)  (Z51.5) Hospice care patient  Comment: acute left wrist pain and swelling, onset this " morning. History of extensive falls, none recently by report, though he continues to be impulsive and reasonably could be injured. Wrist is slightly warm to touch which could be from a number of inflammatory causes, will limit workup considering goals of care but feel Xray is reasonable, consider prednisone if no acute findings.   >patient's sister in law utilized as an independent historian due to dementia  Plan: Treat to comfort per hospice, MS available. Xray 2 view left wrist.  If negative consider prednisone burst.     MED REC REQUIRED  Post Medication Reconciliation Status: patient was not discharged from an inpatient facility or TCU      Orders:  Xray 2 view left wrist      Electronically signed by: SHERI Wong CNP         Sincerely,        SHERI Wong CNP

## 2024-06-04 NOTE — LETTER
"    6/4/2024        RE: Jacob Boland  [address] [address] [address]        [facility] GERIATRICS    Chief Complaint   Patient presents with     Nursing Home Acute     HPI:  Jacob Boland is a 89 year old  (1/11/1933), who is being seen today for an episodic care visit at: [facility]  () [[id number]]. Today's concern is: Seen today for follow-up on left wrist pain, started 5/29 and xray consistent with pseudogout. He was started on prednisone 5/31. Seen today up on the unit in LTC. He is wondering about lunch. He denies wrist pain and is non-tender on exam. He offers limited history due to dementia.    Allergies, and PMH/PSH reviewed in [facility] today.  REVIEW OF SYSTEMS:  Limited secondary to cognitive impairment but today pt reports the above    Objective:   /53   Pulse 56   Temp 97.2  F (36.2  C)   Resp 18   Ht 1.753 m (5' 9\")   Wt 77.1 kg (170 lb)   SpO2 92%   BMI 25.10 kg/m    GENERAL APPEARANCE:  Alert, in no distress  M/S:   left wrist gorssly enlarged, warm to touch, non-tender  SKIN:  CDI  PSYCH:  memory impaired , affect and mood normal    Labs done in SNF are in [facility]. Please refer to them using [employer]/Care Everywhere. and Recent labs in [facility] reviewed by me today.     Assessment/Plan:  (Z51.5) Hospice care patient  (primary encounter diagnosis)  (M11.20) Pseudogout  Comment: Left wrist pseudogout. Started on steroids 5/31, no improvement in exam but pain is well controlled. Prednisone is complete 6/5. No additional workup per goals of care/Hospice following. Consider colchicine if symptoms recur off of prednisone.  Plan: Continue pain regimen, monitor symptoms.       Electronically signed by: SHERI Wong CNP         Sincerely,        SHERI Wong CNP      "

## 2024-06-04 NOTE — PROGRESS NOTES
"[facility] GERIATRICS    Chief Complaint   Patient presents with    Nursing Home Acute     HPI:  Jacob Boland is a 89 year old  (1/11/1933), who is being seen today for an episodic care visit at: [facility]  () [[id number]]. Today's concern is: Seen today for follow-up on left wrist pain, started 5/29 and xray consistent with pseudogout. He was started on prednisone 5/31. Seen today up on the unit in LTC. He is wondering about lunch. He denies wrist pain and is non-tender on exam. He offers limited history due to dementia.    Allergies, and PMH/PSH reviewed in EPIC today.  REVIEW OF SYSTEMS:  Limited secondary to cognitive impairment but today pt reports the above    Objective:   /53   Pulse 56   Temp 97.2  F (36.2  C)   Resp 18   Ht 1.753 m (5' 9\")   Wt 77.1 kg (170 lb)   SpO2 92%   BMI 25.10 kg/m    GENERAL APPEARANCE:  Alert, in no distress  M/S:   left wrist gorssly enlarged, warm to touch, non-tender  SKIN:  CDI  PSYCH:  memory impaired , affect and mood normal    Labs done in SNF are in [facility]. Please refer to them using [employer]/Care Everywhere. and Recent labs in EPIC reviewed by me today.     Assessment/Plan:  (Z51.5) Hospice care patient  (primary encounter diagnosis)  (M11.20) Pseudogout  Comment: Left wrist pseudogout. Started on steroids 5/31, no improvement in exam but pain is well controlled. Prednisone is complete 6/5. No additional workup per goals of care/Hospice following. Consider colchicine if symptoms recur off of prednisone.  Plan: Continue pain regimen, monitor symptoms.       Electronically signed by: SHERI Wong CNP       "